# Patient Record
Sex: FEMALE | Race: WHITE | NOT HISPANIC OR LATINO | Employment: PART TIME | URBAN - METROPOLITAN AREA
[De-identification: names, ages, dates, MRNs, and addresses within clinical notes are randomized per-mention and may not be internally consistent; named-entity substitution may affect disease eponyms.]

---

## 2019-06-25 ENCOUNTER — CONSULT (OUTPATIENT)
Dept: CARDIOLOGY | Facility: CLINIC | Age: 64
End: 2019-06-25

## 2019-06-25 VITALS
BODY MASS INDEX: 24.07 KG/M2 | HEART RATE: 56 BPM | DIASTOLIC BLOOD PRESSURE: 82 MMHG | SYSTOLIC BLOOD PRESSURE: 130 MMHG | HEIGHT: 64 IN | WEIGHT: 141 LBS

## 2019-06-25 DIAGNOSIS — I10 ESSENTIAL HYPERTENSION: ICD-10-CM

## 2019-06-25 DIAGNOSIS — E78.2 MIXED HYPERLIPIDEMIA: Primary | ICD-10-CM

## 2019-06-25 PROCEDURE — 99204 OFFICE O/P NEW MOD 45 MIN: CPT | Performed by: INTERNAL MEDICINE

## 2019-06-25 RX ORDER — VALSARTAN 40 MG/1
40 TABLET ORAL DAILY
COMMUNITY
End: 2019-07-08 | Stop reason: SDUPTHER

## 2019-06-25 RX ORDER — NITROGLYCERIN 0.4 MG/1
0.4 TABLET SUBLINGUAL
COMMUNITY
End: 2020-09-22 | Stop reason: SDUPTHER

## 2019-06-25 RX ORDER — ASPIRIN 81 MG/1
81 TABLET ORAL DAILY
COMMUNITY

## 2019-06-25 RX ORDER — CARVEDILOL 12.5 MG/1
12.5 TABLET ORAL 2 TIMES DAILY WITH MEALS
COMMUNITY
End: 2019-07-08 | Stop reason: SDUPTHER

## 2019-06-25 RX ORDER — ATORVASTATIN CALCIUM 80 MG/1
80 TABLET, FILM COATED ORAL DAILY
COMMUNITY
End: 2019-07-08 | Stop reason: SDUPTHER

## 2019-06-25 NOTE — PROGRESS NOTES
[facility] at [facility]  Consultation H&P  Taya Russ  1955    There is no work phone number on file..    VISIT DATE:  06/25/2019    PCP: Provider, No Known  [facility] [id number]    CC:  Chief Complaint   Patient presents with   • Chest Pain     Previous cardiac studies and procedures:  June 11, 2019 cardiac catheterization: LAD proximal 30 to 40% stenosis, left circumflex luminal irregularities, 20 to 30% RCA, EF 60%, LVEDP 10, localized area of hypokinesis and dyskinesis in the inferior wall.    ASSESSMENT:   Diagnosis Plan   1. Mixed hyperlipidemia  Lipid Panel    Comprehensive Metabolic Panel   2. Essential hypertension     Unclear whether her event was triggered by transient intracoronary thrombosis versus coronary vasospasm versus hypertensive urgency.    PLAN:  Continue dual antiplatelet therapy for 90 days, discontinue Brilinta at that time and continue low-dose aspirin.  Continue carvedilol 12.5 mg p.o. twice daily with meals  Moving valsartan to bedtime dosing to limit side effects  Continue atorvastatin 80 mg p.o. daily, goal LDL less than 100, ideally less than 70.  Repeat lipid panel in 3 months.  Nitroglycerin as needed  Echocardiographic assessment of LVEF and regional wall motion abnormalities in 3 months.    History of Present Illness   64-year-old previously healthy female who developed moderate severe headaches with associated precordial chest discomfort on June 11.  Evaluation by EMS revealed severe hypertension with blood pressures over 200/100 mmHg, and dynamic repolarization abnormalities concerning for an acute inferior ST elevation myocardial infarction.  She underwent an urgent cardiac catheterization at [facility] which only revealed mild nonobstructive coronary atherosclerosis.  Angiogram did reveal normal EF with inferior hypokinesis.  Initial EKGs from [facility] on the day of her heart catheterization were unremarkable.  Follow-up EKG on  "June 12 revealed developing inferior T wave inversions with very subtle inferior ST elevation and no significant Q waves.  EKG today reveals persistent inferior T wave inversions which have now extended to V5 and V6 with the loss of R wave in lead III.  Troponin I of 11.1 on June 12.  CTA chest during hospitalization was unremarkable.  Echo not performed during this hospitalization.  She was treated with dual antiplatelet therapy, high intensity statin therapy and a combination of carvedilol and valsartan for suspected acute coronary syndrome.  Possible intracoronary spasm versus thrombosis which had resolved prior to angiogram or possible hypertensive urgency with associated regional wall motion abnormalities and EKG changes.  She has been stable on her current medical therapy with only mild fatigue.  Blood pressures are now consistently running less than 135/85 mmHg.  No further episodes of chest discomfort.  She has intermittent muscular skeletal tension headaches which are relieved and prevented with monthly deep tissue massage.    Her  passed away from complications from a ruptured gastric ulcer, she is concerned about the long-term risk factors of aspirin therapy.    PHYSICAL EXAMINATION:  Vitals:    06/25/19 1135   BP: 130/82   BP Location: Left arm   Patient Position: Sitting   Pulse: 56   Weight: 64 kg (141 lb)   Height: 162.6 cm (64\")     General Appearance:    Alert, cooperative, no distress, appears stated age   Head:    Normocephalic, without obvious abnormality, atraumatic   Eyes:    conjunctiva/corneas clear, EOM's intact, fundi     benign, both eyes   Ears:    Normal TM's and external ear canals, both ears   Nose:   Nares normal, septum midline, mucosa normal, no drainage    or sinus tenderness   Throat:   Lips, mucosa, and tongue normal; teeth and gums normal   Neck:   Supple, symmetrical, trachea midline, no adenopathy;     thyroid:  no enlargement/tenderness/nodules; no carotid    bruit or " JVD   Back:     Symmetric, no curvature, ROM normal, no CVA tenderness   Lungs:     Clear to auscultation bilaterally, respirations unlabored   Chest Wall:    No tenderness or deformity    Heart:    Regular rate and rhythm, S1 and S2 normal, no murmur, rub   or gallop, normal carotid impulse bilaterally without bruit.   Abdomen:     Soft, non-tender, bowel sounds active all four quadrants,     no masses, no organomegaly   Extremities:   Extremities normal, atraumatic, no cyanosis or edema   Pulses:   2+ and symmetric all extremities   Skin:   Skin color, texture, turgor normal, no rashes or lesions   Lymph nodes:   Cervical, supraclavicular, and axillary nodes normal   Neurologic:   normal strength, sensation intact     throughout       Diagnostic Data:  Procedures  No results found for: CHLPL, TRIG, HDL, LDLDIRECT  No results found for: GLUCOSE, BUN, CREATININE, NA, K, CL, CO2, CREATININE, BUN  No results found for: HGBA1C  No results found for: WBC, HGB, HCT, PLT    PROBLEM LIST:  Patient Active Problem List   Diagnosis   • Essential hypertension       PAST MEDICAL HX  History reviewed. No pertinent past medical history.    Allergies  No Known Allergies    Current Medications    Current Outpatient Medications:   •  aspirin 81 MG EC tablet, Take 81 mg by mouth Daily., Disp: , Rfl:   •  atorvastatin (LIPITOR) 80 MG tablet, Take 80 mg by mouth Daily., Disp: , Rfl:   •  carvedilol (COREG) 12.5 MG tablet, Take 12.5 mg by mouth 2 (Two) Times a Day With Meals., Disp: , Rfl:   •  nitroglycerin (NITROSTAT) 0.4 MG SL tablet, Place 0.4 mg under the tongue Every 5 (Five) Minutes As Needed for Chest Pain. Take no more than 3 doses in 15 minutes., Disp: , Rfl:   •  ticagrelor (BRILINTA) 90 MG tablet tablet, Take 90 mg by mouth 2 (Two) Times a Day., Disp: , Rfl:   •  valsartan (DIOVAN) 40 MG tablet, Take 40 mg by mouth Daily., Disp: , Rfl:          ROS  Review of Systems   Constitution: Positive for malaise/fatigue.   HENT:  Positive for tinnitus.    Musculoskeletal: Positive for myalgias.       All other body systems reviewed and are negative    SOCIAL HX  Social History     Socioeconomic History   • Marital status:      Spouse name: Not on file   • Number of children: Not on file   • Years of education: Not on file   • Highest education level: Not on file   Tobacco Use   • Smoking status: Former Smoker     Last attempt to quit:      Years since quittin.5   • Smokeless tobacco: Never Used   Substance and Sexual Activity   • Alcohol use: Yes     Alcohol/week: 0.6 - 1.2 oz     Types: 1 - 2 Standard drinks or equivalent per week     Frequency: Monthly or less     Drinks per session: 1 or 2     Binge frequency: Never   • Drug use: No   • Sexual activity: Defer       FAMILY HX  Family History   Problem Relation Age of Onset   • No Known Problems Mother    • No Known Problems Father    • No Known Problems Sister    • No Known Problems Sister    • No Known Problems Sister              Aleks Richard III, MD, FACC

## 2019-07-08 RX ORDER — ATORVASTATIN CALCIUM 80 MG/1
80 TABLET, FILM COATED ORAL DAILY
Qty: 90 TABLET | Refills: 1 | Status: SHIPPED | OUTPATIENT
Start: 2019-07-08 | End: 2019-10-23 | Stop reason: DRUGHIGH

## 2019-07-08 RX ORDER — CARVEDILOL 12.5 MG/1
12.5 TABLET ORAL 2 TIMES DAILY WITH MEALS
Qty: 180 TABLET | Refills: 1 | Status: SHIPPED | OUTPATIENT
Start: 2019-07-08 | End: 2019-08-12 | Stop reason: DRUGHIGH

## 2019-07-08 RX ORDER — VALSARTAN 40 MG/1
40 TABLET ORAL DAILY
Qty: 90 TABLET | Refills: 1 | Status: SHIPPED | OUTPATIENT
Start: 2019-07-08 | End: 2020-01-23

## 2019-07-11 ENCOUNTER — TELEPHONE (OUTPATIENT)
Dept: CARDIOLOGY | Facility: CLINIC | Age: 64
End: 2019-07-11

## 2019-07-11 NOTE — TELEPHONE ENCOUNTER
Had low b/p reading yesterday 90/50 Did not feel well. Tired. Feeling better today. Gave B/P readings. No HR'S reported.    7/5/19  119/85  7/6/19  117/80  7/7/19  112/78  7/8/19  136/92  7/9/19  114/71  7/10/19  117/76  7/11/19  95/64    She wants to know if you want to make any medication adjustments. Please advise.

## 2019-08-12 RX ORDER — CARVEDILOL 6.25 MG/1
TABLET ORAL
Qty: 90 TABLET | Refills: 1 | Status: SHIPPED | OUTPATIENT
Start: 2019-08-12 | End: 2019-10-22 | Stop reason: SDUPTHER

## 2019-09-11 RX ORDER — TICAGRELOR 90 MG/1
TABLET ORAL
Qty: 60 TABLET | Refills: 1 | Status: SHIPPED | OUTPATIENT
Start: 2019-09-11 | End: 2019-10-22

## 2019-10-14 ENCOUNTER — HOSPITAL ENCOUNTER (OUTPATIENT)
Dept: CARDIOLOGY | Facility: HOSPITAL | Age: 64
Discharge: HOME | End: 2019-10-14

## 2019-10-14 DIAGNOSIS — Z00.6 ENCOUNTER FOR EXAMINATION FOR NORMAL COMPARISON OR CONTROL IN CLINICAL RESEARCH PROGRAM: ICD-10-CM

## 2019-10-22 ENCOUNTER — OFFICE VISIT (OUTPATIENT)
Dept: CARDIOLOGY | Facility: CLINIC | Age: 64
End: 2019-10-22

## 2019-10-22 VITALS
HEIGHT: 64 IN | WEIGHT: 139 LBS | HEART RATE: 64 BPM | DIASTOLIC BLOOD PRESSURE: 84 MMHG | BODY MASS INDEX: 23.73 KG/M2 | SYSTOLIC BLOOD PRESSURE: 134 MMHG | OXYGEN SATURATION: 98 %

## 2019-10-22 DIAGNOSIS — I10 ESSENTIAL HYPERTENSION: Primary | ICD-10-CM

## 2019-10-22 DIAGNOSIS — I42.8 NON-ISCHEMIC CARDIOMYOPATHY (HCC): ICD-10-CM

## 2019-10-22 PROCEDURE — 99213 OFFICE O/P EST LOW 20 MIN: CPT | Performed by: INTERNAL MEDICINE

## 2019-10-22 NOTE — PROGRESS NOTES
[facility] at [facility]  Office visit  Taya Russ  1955    [phone] (work)    VISIT DATE:  10/22/2019    PCP: Provider, No Known  [place] [phone]    CC:  Chief Complaint   Patient presents with   • Essential hypertension   • Shortness of Breath       Previous cardiac studies and procedures:  June 11, 2019 cardiac catheterization: LAD proximal 30 to 40% stenosis, left circumflex luminal irregularities, 20 to 30% RCA, EF 60%, LVEDP 10, localized area of hypokinesis and dyskinesis in the inferior wall.  ASSESSMENT:   Diagnosis Plan   1. Essential hypertension         PLAN:  Hypertension: Goal less than 130/80 mmHg.  Currently well controlled.  Continue combination of carvedilol and valsartan.    Coronary vasospasm: Personally reviewed catheterization imaging.  She appeared to have mild diffuse coronary vasospasm involving the mid to distal segments of her epicardial vessels.  This was potentially triggered by her hypertensive urgency.  Discontinuing Brilinta, continue low-dose aspirin every other day.  Continue afterload control and as needed nitroglycerin.  Will obtain fasting lipid panel, goal LDL less than 100, will likely be able to wean back on her atorvastatin dose.    Cardiomyopathy: Nonischemic, suspect due to hypertension.  Recommending cardiac MRI for definitive assessment of regional wall motion abnormality and for evidence of myocardial infarction/fibrosis given focal inferior wall regional wall motion abnormality is noted on LV angiography.    Subjective  Interval assessment: Blood pressures now predominantly running less than 135/85 mmHg.  She denies chest pain and palpitations.  Has mild shortness of breath with such activities as walking up to 3 flights of stairs.  Intermittently feels like she cannot get a good deep breath.  Denies easy bruising or bleeding complications on combination of aspirin and Brilinta.  Denies myalgias on statin  therapy.    Initial presentation:64-year-old previously healthy female who developed moderate severe headaches with associated precordial chest discomfort on June 11.  Evaluation by EMS revealed severe hypertension with blood pressures over 200/100 mmHg, and dynamic repolarization abnormalities concerning for an acute inferior ST elevation myocardial infarction.  She underwent an urgent cardiac catheterization at [facility] which only revealed mild nonobstructive coronary atherosclerosis.  Angiogram did reveal normal EF with inferior hypokinesis.  Initial EKGs from [facility] on the day of her heart catheterization were unremarkable.  Follow-up EKG on June 12 revealed developing inferior T wave inversions with very subtle inferior ST elevation and no significant Q waves.  EKG today reveals persistent inferior T wave inversions which have now extended to V5 and V6 with the loss of R wave in lead III.  Troponin I of 11.1 on June 12.  CTA chest during hospitalization was unremarkable.  Echo not performed during this hospitalization.  She was treated with dual antiplatelet therapy, high intensity statin therapy and a combination of carvedilol and valsartan for suspected acute coronary syndrome.  Possible intracoronary spasm versus thrombosis which had resolved prior to angiogram or possible hypertensive urgency with associated regional wall motion abnormalities and EKG changes.  She has been stable on her current medical therapy with only mild fatigue.  Blood pressures are now consistently running less than 135/85 mmHg.  No further episodes of chest discomfort.  She has intermittent muscular skeletal tension headaches which are relieved and prevented with monthly deep tissue massage.     Her  passed away from complications from a ruptured gastric ulcer, she is concerned about the long-term risk factors of aspirin therapy.    PHYSICAL EXAMINATION:  Vitals:    10/22/19 1533   Weight: 63 kg (139 lb)   Height:  "162.6 cm (64\")     General Appearance:    Alert, cooperative, no distress, appears stated age   Head:    Normocephalic, without obvious abnormality, atraumatic   Eyes:    conjunctiva/corneas clear   Nose:   Nares normal, septum midline, mucosa normal, no drainage   Throat:   Lips, teeth and gums normal   Neck:   Supple, symmetrical, trachea midline, no carotid    bruit or JVD   Lungs:     Clear to auscultation bilaterally, respirations unlabored   Chest Wall:    No tenderness or deformity    Heart:    Regular rate and rhythm, S1 and S2 normal, no murmur, rub   or gallop, normal carotid impulse bilaterally without bruit.   Abdomen:     Soft, non-tender   Extremities:   Extremities normal, atraumatic, no cyanosis or edema   Pulses:   2+ and symmetric all extremities   Skin:   Skin color, texture, turgor normal, no rashes or lesions       Diagnostic Data:  Procedures  No results found for: CHLPL, TRIG, HDL, LDLDIRECT  No results found for: GLUCOSE, BUN, CREATININE, NA, K, CL, CO2, CREATININE, BUN  No results found for: HGBA1C  No results found for: WBC, HGB, HCT, PLT    Allergies  No Known Allergies    Current Medications    Current Outpatient Medications:   •  aspirin 81 MG EC tablet, Take 81 mg by mouth Daily., Disp: , Rfl:   •  atorvastatin (LIPITOR) 80 MG tablet, Take 1 tablet by mouth Daily., Disp: 90 tablet, Rfl: 1  •  BRILINTA 90 MG tablet tablet, TAKE ONE TABLET BY MOUTH TWICE A DAY, Disp: 60 tablet, Rfl: 1  •  Calcium Citrate (CITRACAL PO), Take 2 tablets by mouth 2 (Two) Times a Day., Disp: , Rfl:   •  carvedilol (COREG) 6.25 MG tablet, Take one tablet by mouth in the morning with a meal and two tablets by mouth in the evening with meals., Disp: 90 tablet, Rfl: 1  •  nitroglycerin (NITROSTAT) 0.4 MG SL tablet, Place 0.4 mg under the tongue Every 5 (Five) Minutes As Needed for Chest Pain. Take no more than 3 doses in 15 minutes., Disp: , Rfl:   •  valsartan (DIOVAN) 40 MG tablet, Take 1 tablet by mouth Daily., " Disp: 90 tablet, Rfl: 1          ROS  Review of Systems   Cardiovascular: Positive for dyspnea on exertion.   Respiratory: Positive for shortness of breath.          SOCIAL HX  Social History     Socioeconomic History   • Marital status:      Spouse name: Not on file   • Number of children: Not on file   • Years of education: Not on file   • Highest education level: Not on file   Tobacco Use   • Smoking status: Former Smoker     Last attempt to quit:      Years since quittin.8   • Smokeless tobacco: Never Used   Substance and Sexual Activity   • Alcohol use: Yes     Alcohol/week: 0.6 - 1.2 oz     Types: 1 - 2 Standard drinks or equivalent per week     Frequency: Monthly or less     Drinks per session: 1 or 2     Binge frequency: Never   • Drug use: No   • Sexual activity: Defer       FAMILY HX  Family History   Problem Relation Age of Onset   • No Known Problems Mother    • No Known Problems Father    • No Known Problems Sister    • No Known Problems Sister    • No Known Problems Sister              Aleks Richard III, MD, FACC

## 2019-10-23 ENCOUNTER — TELEPHONE (OUTPATIENT)
Dept: CARDIOLOGY | Facility: CLINIC | Age: 64
End: 2019-10-23

## 2019-10-23 DIAGNOSIS — I10 ESSENTIAL HYPERTENSION: Primary | ICD-10-CM

## 2019-10-23 RX ORDER — CARVEDILOL 6.25 MG/1
TABLET ORAL
Qty: 90 TABLET | Refills: 3 | Status: SHIPPED | OUTPATIENT
Start: 2019-10-23 | End: 2020-03-23

## 2019-10-23 RX ORDER — ATORVASTATIN CALCIUM 40 MG/1
40 TABLET, FILM COATED ORAL DAILY
Qty: 30 TABLET | Refills: 5 | Status: SHIPPED | OUTPATIENT
Start: 2019-10-23 | End: 2020-03-11 | Stop reason: DRUGHIGH

## 2019-10-23 NOTE — TELEPHONE ENCOUNTER
Dr. Richard reviewed patients labs from PCP. Per his orders. Called patient to tell her to decrease her Atorvastatin to 40 mg by mouth daily and needs a repeat BMP this week. Left voice message for her to call our office. Orders placed in [email or web address].

## 2019-10-24 ENCOUNTER — LAB (OUTPATIENT)
Dept: LAB | Facility: HOSPITAL | Age: 64
End: 2019-10-24

## 2019-10-24 DIAGNOSIS — I10 ESSENTIAL HYPERTENSION: ICD-10-CM

## 2019-10-24 PROCEDURE — 80048 BASIC METABOLIC PNL TOTAL CA: CPT

## 2019-10-25 ENCOUNTER — TELEPHONE (OUTPATIENT)
Dept: CARDIOLOGY | Facility: CLINIC | Age: 64
End: 2019-10-25

## 2019-10-25 LAB
ANION GAP SERPL CALCULATED.3IONS-SCNC: 10.9 MMOL/L (ref 5–15)
BUN BLD-MCNC: 19 MG/DL (ref 8–23)
BUN/CREAT SERPL: 28.8 (ref 7–25)
CALCIUM SPEC-SCNC: 9.3 MG/DL (ref 8.6–10.5)
CHLORIDE SERPL-SCNC: 104 MMOL/L (ref 98–107)
CO2 SERPL-SCNC: 26.1 MMOL/L (ref 22–29)
CREAT BLD-MCNC: 0.66 MG/DL (ref 0.57–1)
GFR SERPL CREATININE-BSD FRML MDRD: 90 ML/MIN/1.73
GLUCOSE BLD-MCNC: 91 MG/DL (ref 65–99)
POTASSIUM BLD-SCNC: 4.6 MMOL/L (ref 3.5–5.2)
SODIUM BLD-SCNC: 141 MMOL/L (ref 136–145)

## 2019-10-25 NOTE — TELEPHONE ENCOUNTER
----- Message from Aleks Richard III, MD sent at 10/25/2019 11:54 AM EDT -----  Potassium levels have normalized

## 2019-11-04 ENCOUNTER — HOSPITAL ENCOUNTER (OUTPATIENT)
Dept: MRI IMAGING | Facility: HOSPITAL | Age: 64
Discharge: HOME | End: 2019-11-04
Admitting: INTERNAL MEDICINE

## 2019-11-04 PROCEDURE — 75561 CARDIAC MRI FOR MORPH W/DYE: CPT

## 2019-11-04 PROCEDURE — A9577 INJ MULTIHANCE: HCPCS | Performed by: INTERNAL MEDICINE

## 2019-11-04 PROCEDURE — 0 GADOBENATE DIMEGLUMINE 529 MG/ML SOLUTION: Performed by: INTERNAL MEDICINE

## 2019-11-04 RX ADMIN — GADOBENATE DIMEGLUMINE 20 ML: 529 INJECTION, SOLUTION INTRAVENOUS at 11:09

## 2020-01-23 RX ORDER — VALSARTAN 40 MG/1
TABLET ORAL
Qty: 30 TABLET | Refills: 0 | Status: SHIPPED | OUTPATIENT
Start: 2020-01-23 | End: 2020-02-26

## 2020-02-26 RX ORDER — VALSARTAN 40 MG/1
TABLET ORAL
Qty: 30 TABLET | Refills: 1 | Status: SHIPPED | OUTPATIENT
Start: 2020-02-26 | End: 2020-03-10 | Stop reason: SDUPTHER

## 2020-03-10 ENCOUNTER — APPOINTMENT (OUTPATIENT)
Dept: LAB | Facility: HOSPITAL | Age: 65
End: 2020-03-10

## 2020-03-10 ENCOUNTER — OFFICE VISIT (OUTPATIENT)
Dept: CARDIOLOGY | Facility: CLINIC | Age: 65
End: 2020-03-10

## 2020-03-10 VITALS
HEIGHT: 64 IN | BODY MASS INDEX: 25.23 KG/M2 | OXYGEN SATURATION: 98 % | HEART RATE: 62 BPM | DIASTOLIC BLOOD PRESSURE: 86 MMHG | WEIGHT: 147.8 LBS | SYSTOLIC BLOOD PRESSURE: 138 MMHG

## 2020-03-10 DIAGNOSIS — E78.2 MIXED HYPERLIPIDEMIA: Primary | ICD-10-CM

## 2020-03-10 DIAGNOSIS — I10 ESSENTIAL HYPERTENSION: ICD-10-CM

## 2020-03-10 DIAGNOSIS — I25.10 CORONARY ARTERY DISEASE INVOLVING NATIVE CORONARY ARTERY OF NATIVE HEART WITHOUT ANGINA PECTORIS: ICD-10-CM

## 2020-03-10 LAB
ALBUMIN SERPL-MCNC: 4.4 G/DL (ref 3.5–5.2)
ALBUMIN/GLOB SERPL: 2 G/DL
ALP SERPL-CCNC: 48 U/L (ref 39–117)
ALT SERPL W P-5'-P-CCNC: 15 U/L (ref 1–33)
ANION GAP SERPL CALCULATED.3IONS-SCNC: 14.1 MMOL/L (ref 5–15)
AST SERPL-CCNC: 16 U/L (ref 1–32)
BILIRUB SERPL-MCNC: 0.4 MG/DL (ref 0.2–1.2)
BUN BLD-MCNC: 11 MG/DL (ref 8–23)
BUN/CREAT SERPL: 18 (ref 7–25)
CALCIUM SPEC-SCNC: 9.7 MG/DL (ref 8.6–10.5)
CHLORIDE SERPL-SCNC: 103 MMOL/L (ref 98–107)
CHOLEST SERPL-MCNC: 116 MG/DL (ref 0–200)
CO2 SERPL-SCNC: 23.9 MMOL/L (ref 22–29)
CREAT BLD-MCNC: 0.61 MG/DL (ref 0.57–1)
GFR SERPL CREATININE-BSD FRML MDRD: 99 ML/MIN/1.73
GLOBULIN UR ELPH-MCNC: 2.2 GM/DL
GLUCOSE BLD-MCNC: 99 MG/DL (ref 65–99)
HDLC SERPL-MCNC: 56 MG/DL (ref 40–60)
LDLC SERPL CALC-MCNC: 48 MG/DL (ref 0–100)
LDLC/HDLC SERPL: 0.86 {RATIO}
POTASSIUM BLD-SCNC: 4.8 MMOL/L (ref 3.5–5.2)
PROT SERPL-MCNC: 6.6 G/DL (ref 6–8.5)
SODIUM BLD-SCNC: 141 MMOL/L (ref 136–145)
TRIGL SERPL-MCNC: 58 MG/DL (ref 0–150)
VLDLC SERPL-MCNC: 11.6 MG/DL (ref 5–40)

## 2020-03-10 PROCEDURE — 80061 LIPID PANEL: CPT | Performed by: INTERNAL MEDICINE

## 2020-03-10 PROCEDURE — 80053 COMPREHEN METABOLIC PANEL: CPT | Performed by: INTERNAL MEDICINE

## 2020-03-10 PROCEDURE — 36415 COLL VENOUS BLD VENIPUNCTURE: CPT | Performed by: INTERNAL MEDICINE

## 2020-03-10 PROCEDURE — 99213 OFFICE O/P EST LOW 20 MIN: CPT | Performed by: INTERNAL MEDICINE

## 2020-03-10 RX ORDER — VALSARTAN 40 MG/1
40 TABLET ORAL DAILY
Qty: 90 TABLET | Refills: 3 | Status: SHIPPED | OUTPATIENT
Start: 2020-03-10 | End: 2020-09-22 | Stop reason: SDUPTHER

## 2020-03-10 NOTE — PROGRESS NOTES
[facility] at [facility]  Office visit  Taya Russ  1955    [phone] (work)    VISIT DATE:  3/10/2020    PCP: Provider, Thelma Known  [place] [phone]    CC:  Chief Complaint   Patient presents with   • Hypertension       Previous cardiac studies and procedures:  June 11, 2019 cardiac catheterization: LAD proximal 30 to 40% stenosis, left circumflex luminal irregularities, 20 to 30% RCA, EF 60%, LVEDP 10, localized area of hypokinesis and dyskinesis in the inferior wall.    November 2019 cardiac MRI: Small mid inferior wall transmural infarct, EF 60%    ASSESSMENT:   Diagnosis Plan   1. Mixed hyperlipidemia  Lipid Panel    Comprehensive Metabolic Panel   2. Coronary artery disease involving native coronary artery of native heart without angina pectoris     3. Essential hypertension         PLAN:  Hypertension: Goal less than 130/80 mmHg.  Currently well controlled.  Continue combination of carvedilol and valsartan.    Coronary disease: Unclear etiology for inferior myocardial infarction, potential etiologies include prolonged vasospasm, embolic event, scad.  Angiography did not reveal significant atherosclerotic disease.  Currently asymptomatic.  Continue aspirin, statin and afterload reduction.    Hyperlipidemia: Goal LDL less than 70.  Repeat fasting lipid panel pending.  Currently on atorvastatin 40 mg p.o. Daily.    Subjective  Interval assessment: Blood pressures running less than 130/80 mmHg.  She is compliant with medical therapy.  Maintaining an active lifestyle without limitation.  Denies easy bruising or bleeding complications on aspirin.  Denies myalgias on statin therapy.    Initial presentation:64-year-old previously healthy female who developed moderate severe headaches with associated precordial chest discomfort on June 11.  Evaluation by EMS revealed severe hypertension with blood pressures over 200/100 mmHg, and dynamic repolarization abnormalities  "concerning for an acute inferior ST elevation myocardial infarction.  She underwent an urgent cardiac catheterization at [facility] which only revealed mild nonobstructive coronary atherosclerosis.  Angiogram did reveal normal EF with inferior hypokinesis.  Initial EKGs from [facility] on the day of her heart catheterization were unremarkable.  Follow-up EKG on June 12 revealed developing inferior T wave inversions with very subtle inferior ST elevation and no significant Q waves.  EKG today reveals persistent inferior T wave inversions which have now extended to V5 and V6 with the loss of R wave in lead III.  Troponin I of 11.1 on June 12.  CTA chest during hospitalization was unremarkable.  Echo not performed during this hospitalization.  She was treated with dual antiplatelet therapy, high intensity statin therapy and a combination of carvedilol and valsartan for suspected acute coronary syndrome.  Possible intracoronary spasm versus thrombosis which had resolved prior to angiogram or possible hypertensive urgency with associated regional wall motion abnormalities and EKG changes.  She has been stable on her current medical therapy with only mild fatigue.  Blood pressures are now consistently running less than 135/85 mmHg.  No further episodes of chest discomfort.  She has intermittent muscular skeletal tension headaches which are relieved and prevented with monthly deep tissue massage.     Her  passed away from complications from a ruptured gastric ulcer, she is concerned about the long-term risk factors of aspirin therapy.    PHYSICAL EXAMINATION:  Vitals:    03/10/20 0935   BP: 138/86   BP Location: Left arm   Patient Position: Sitting   Pulse: 62   SpO2: 98%   Weight: 67 kg (147 lb 12.8 oz)   Height: 162.6 cm (64\")     General Appearance:    Alert, cooperative, no distress, appears stated age   Head:    Normocephalic, without obvious abnormality, atraumatic   Eyes:    conjunctiva/corneas clear "   Nose:   Nares normal, septum midline, mucosa normal, no drainage   Throat:   Lips, teeth and gums normal   Neck:   Supple, symmetrical, trachea midline, no carotid    bruit or JVD   Lungs:     Clear to auscultation bilaterally, respirations unlabored   Chest Wall:    No tenderness or deformity    Heart:    Regular rate and rhythm, S1 and S2 normal, no murmur, rub   or gallop, normal carotid impulse bilaterally without bruit.   Abdomen:     Soft, non-tender   Extremities:   Extremities normal, atraumatic, no cyanosis or edema   Pulses:   2+ and symmetric all extremities   Skin:   Skin color, texture, turgor normal, no rashes or lesions       Diagnostic Data:  Procedures  No results found for: CHLPL, TRIG, HDL, LDLDIRECT  Lab Results   Component Value Date    GLUCOSE 91 10/24/2019    BUN 19 10/24/2019    CREATININE 0.66 10/24/2019     10/24/2019    K 4.6 10/24/2019     10/24/2019    CO2 26.1 10/24/2019     No results found for: HGBA1C  No results found for: WBC, HGB, HCT, PLT    Allergies  No Known Allergies    Current Medications    Current Outpatient Medications:   •  aspirin 81 MG EC tablet, Take 81 mg by mouth Daily., Disp: , Rfl:   •  atorvastatin (LIPITOR) 40 MG tablet, Take 1 tablet by mouth Daily., Disp: 30 tablet, Rfl: 5  •  Calcium Citrate (CITRACAL PO), Take 2 tablets by mouth 2 (Two) Times a Day., Disp: , Rfl:   •  carvedilol (COREG) 6.25 MG tablet, TAKE ONE TABLET BY MOUTH EVERY MORNING WITH A MEAL AND TAKE TWO TABLETS BY MOUTH EVERY EVENING WITH MEALS, Disp: 90 tablet, Rfl: 3  •  nitroglycerin (NITROSTAT) 0.4 MG SL tablet, Place 0.4 mg under the tongue Every 5 (Five) Minutes As Needed for Chest Pain. Take no more than 3 doses in 15 minutes., Disp: , Rfl:   •  valsartan (DIOVAN) 40 MG tablet, Take 1 tablet by mouth Daily., Disp: 90 tablet, Rfl: 3          ROS  Review of Systems   Cardiovascular: Positive for dyspnea on exertion.   Respiratory: Positive for shortness of breath.           SOCIAL HX  Social History     Socioeconomic History   • Marital status:      Spouse name: Not on file   • Number of children: Not on file   • Years of education: Not on file   • Highest education level: Not on file   Tobacco Use   • Smoking status: Former Smoker     Packs/day: 1.00     Types: Cigarettes     Last attempt to quit:      Years since quittin.2   • Smokeless tobacco: Never Used   Substance and Sexual Activity   • Alcohol use: Yes     Alcohol/week: 1.0 - 2.0 standard drinks     Types: 1 - 2 Standard drinks or equivalent per week     Frequency: Monthly or less     Drinks per session: 1 or 2     Binge frequency: Never     Comment: occas   • Drug use: No   • Sexual activity: Defer       FAMILY HX  Family History   Problem Relation Age of Onset   • Hypertension Mother    • No Known Problems Father    • No Known Problems Sister    • No Known Problems Sister    • No Known Problems Sister              Aleks Richard III, MD, FACC

## 2020-03-11 ENCOUNTER — TELEPHONE (OUTPATIENT)
Dept: CARDIOLOGY | Facility: CLINIC | Age: 65
End: 2020-03-11

## 2020-03-11 RX ORDER — ATORVASTATIN CALCIUM 20 MG/1
20 TABLET, FILM COATED ORAL DAILY
Qty: 30 TABLET | Refills: 5 | Status: SHIPPED | OUTPATIENT
Start: 2020-03-11 | End: 2020-09-22 | Stop reason: SDUPTHER

## 2020-03-11 NOTE — TELEPHONE ENCOUNTER
----- Message from Aleks Richard III, MD sent at 3/11/2020  8:30 AM EDT -----  Cholesterol numbers look great.  We can decrease the atorvastatin down to 20 mg p.o. daily.

## 2020-03-23 RX ORDER — CARVEDILOL 6.25 MG/1
TABLET ORAL
Qty: 90 TABLET | Refills: 3 | Status: SHIPPED | OUTPATIENT
Start: 2020-03-23 | End: 2020-06-26

## 2020-05-07 RX ORDER — VALSARTAN 40 MG/1
TABLET ORAL
Qty: 30 TABLET | Refills: 0 | OUTPATIENT
Start: 2020-05-07

## 2020-06-26 RX ORDER — CARVEDILOL 6.25 MG/1
TABLET ORAL
Qty: 90 TABLET | Refills: 2 | Status: SHIPPED | OUTPATIENT
Start: 2020-06-26 | End: 2020-09-22 | Stop reason: SDUPTHER

## 2020-06-29 RX ORDER — CARVEDILOL 6.25 MG/1
TABLET ORAL
Qty: 90 TABLET | Refills: 2 | OUTPATIENT
Start: 2020-06-29

## 2020-09-22 ENCOUNTER — OFFICE VISIT (OUTPATIENT)
Dept: CARDIOLOGY | Facility: CLINIC | Age: 65
End: 2020-09-22

## 2020-09-22 VITALS
DIASTOLIC BLOOD PRESSURE: 80 MMHG | SYSTOLIC BLOOD PRESSURE: 136 MMHG | HEART RATE: 62 BPM | WEIGHT: 138.8 LBS | BODY MASS INDEX: 23.7 KG/M2 | OXYGEN SATURATION: 98 % | HEIGHT: 64 IN

## 2020-09-22 DIAGNOSIS — E78.2 MIXED HYPERLIPIDEMIA: ICD-10-CM

## 2020-09-22 DIAGNOSIS — I10 ESSENTIAL HYPERTENSION: Primary | ICD-10-CM

## 2020-09-22 DIAGNOSIS — I25.10 CORONARY ARTERY DISEASE INVOLVING NATIVE CORONARY ARTERY OF NATIVE HEART WITHOUT ANGINA PECTORIS: ICD-10-CM

## 2020-09-22 PROCEDURE — 99214 OFFICE O/P EST MOD 30 MIN: CPT | Performed by: INTERNAL MEDICINE

## 2020-09-22 RX ORDER — VALSARTAN 40 MG/1
40 TABLET ORAL DAILY
Qty: 90 TABLET | Refills: 3 | Status: SHIPPED | OUTPATIENT
Start: 2020-09-22 | End: 2021-09-28 | Stop reason: SDUPTHER

## 2020-09-22 RX ORDER — ATORVASTATIN CALCIUM 20 MG/1
20 TABLET, FILM COATED ORAL DAILY
Qty: 90 TABLET | Refills: 3 | Status: SHIPPED | OUTPATIENT
Start: 2020-09-22 | End: 2021-09-24

## 2020-09-22 RX ORDER — NITROGLYCERIN 0.4 MG/1
0.4 TABLET SUBLINGUAL
Qty: 25 TABLET | Refills: 3 | Status: SHIPPED | OUTPATIENT
Start: 2020-09-22

## 2020-09-22 RX ORDER — CARVEDILOL 6.25 MG/1
TABLET ORAL
Qty: 90 TABLET | Refills: 3 | Status: SHIPPED | OUTPATIENT
Start: 2020-09-22 | End: 2021-02-17

## 2020-09-22 RX ORDER — COLLAGEN, HYDROLYSATE (BOVINE) 100 %
POWDER (GRAM) MISCELLANEOUS
COMMUNITY

## 2020-09-22 NOTE — PROGRESS NOTES
[facility] at [facility]  Office visit  Taya Russ  1955    There is no work phone number on file.    VISIT DATE:  9/22/2020    PCP: Provider, Thelma Known  [facility] [id number]    CC:  Chief Complaint   Patient presents with   • Mixed hyperlipidemia       Previous cardiac studies and procedures:  June 11, 2019 cardiac catheterization: LAD proximal 30 to 40% stenosis, left circumflex luminal irregularities, 20 to 30% RCA, EF 60%, LVEDP 10, localized area of hypokinesis and dyskinesis in the inferior wall.    November 2019 cardiac MRI: Small mid inferior wall transmural infarct, EF 60%    ASSESSMENT:   Diagnosis Plan   1. Essential hypertension     2. Coronary artery disease involving native coronary artery of native heart without angina pectoris     3. Mixed hyperlipidemia  Lipid Panel    Comprehensive Metabolic Panel       PLAN:  Hypertension: Goal less than 130/80 mmHg.  Currently well controlled.  Continue combination of carvedilol and valsartan.    Coronary disease: Unclear etiology for inferior myocardial infarction, potential etiologies include prolonged vasospasm, embolic event, scad.  Angiography did not reveal significant atherosclerotic disease.  Currently asymptomatic.  Continue aspirin, statin and afterload reduction.    Hyperlipidemia: Goal LDL less than 70.  Previously well controlled.  Repeat fasting lipid panel pending.  Currently on atorvastatin 20 mg p.o. Daily.    Subjective  Interval assessment: Blood pressures running less than 130/80 mmHg.  She is compliant with medical therapy.  Maintaining an active lifestyle without limitation, some mild fatigue but otherwise no limitations..  Denies easy bruising or bleeding complications on aspirin.  Denies myalgias on statin therapy.    Initial presentation:64-year-old previously healthy female who developed moderate severe headaches with associated precordial chest discomfort on June 11.  Evaluation by EMS revealed  "severe hypertension with blood pressures over 200/100 mmHg, and dynamic repolarization abnormalities concerning for an acute inferior ST elevation myocardial infarction.  She underwent an urgent cardiac catheterization at [facility] which only revealed mild nonobstructive coronary atherosclerosis.  Angiogram did reveal normal EF with inferior hypokinesis.  Initial EKGs from [facility] on the day of her heart catheterization were unremarkable.  Follow-up EKG on June 12 revealed developing inferior T wave inversions with very subtle inferior ST elevation and no significant Q waves.  EKG today reveals persistent inferior T wave inversions which have now extended to V5 and V6 with the loss of R wave in lead III.  Troponin I of 11.1 on June 12.  CTA chest during hospitalization was unremarkable.  Echo not performed during this hospitalization.  She was treated with dual antiplatelet therapy, high intensity statin therapy and a combination of carvedilol and valsartan for suspected acute coronary syndrome.  Possible intracoronary spasm versus thrombosis which had resolved prior to angiogram or possible hypertensive urgency with associated regional wall motion abnormalities and EKG changes.  She has been stable on her current medical therapy with only mild fatigue.  Blood pressures are now consistently running less than 135/85 mmHg.  No further episodes of chest discomfort.  She has intermittent muscular skeletal tension headaches which are relieved and prevented with monthly deep tissue massage.     Her  passed away from complications from a ruptured gastric ulcer, she is concerned about the long-term risk factors of aspirin therapy.    PHYSICAL EXAMINATION:  Vitals:    09/22/20 0932   BP: 136/80   BP Location: Right arm   Patient Position: Sitting   Pulse: 62   SpO2: 98%   Weight: 63 kg (138 lb 12.8 oz)   Height: 162.6 cm (64\")     General Appearance:    Alert, cooperative, no distress, appears stated age "   Head:    Normocephalic, without obvious abnormality, atraumatic   Eyes:    conjunctiva/corneas clear   Nose:   Nares normal, septum midline, mucosa normal, no drainage   Throat:   Lips, teeth and gums normal   Neck:   Supple, symmetrical, trachea midline, no carotid    bruit or JVD   Lungs:     Clear to auscultation bilaterally, respirations unlabored   Chest Wall:    No tenderness or deformity    Heart:    Regular rate and rhythm, S1 and S2 normal, no murmur, rub   or gallop, normal carotid impulse bilaterally without bruit.   Abdomen:     Soft, non-tender   Extremities:   Extremities normal, atraumatic, no cyanosis or edema   Pulses:   2+ and symmetric all extremities   Skin:   Skin color, texture, turgor normal, no rashes or lesions       Diagnostic Data:  Procedures  Lab Results   Component Value Date    TRIG 58 03/10/2020    HDL 56 03/10/2020     Lab Results   Component Value Date    GLUCOSE 99 03/10/2020    BUN 11 03/10/2020    CREATININE 0.61 03/10/2020     03/10/2020    K 4.8 03/10/2020     03/10/2020    CO2 23.9 03/10/2020     No results found for: HGBA1C  No results found for: WBC, HGB, HCT, PLT    Allergies  No Known Allergies    Current Medications    Current Outpatient Medications:   •  aspirin 81 MG EC tablet, Take 81 mg by mouth Daily., Disp: , Rfl:   •  atorvastatin (LIPITOR) 20 MG tablet, Take 1 tablet by mouth Daily., Disp: 90 tablet, Rfl: 3  •  Calcium Citrate (CITRACAL PO), Take 2 tablets by mouth 2 (Two) Times a Day., Disp: , Rfl:   •  carvedilol (COREG) 6.25 MG tablet, TAKE ONE TABLET BY MOUTH EVERY MORNING AND TAKE TWO TABLETS BY MOUTH EVERY EVENING WITH A MEAL, Disp: 90 tablet, Rfl: 3  •  Collagen Hydrolysate powder, , Disp: , Rfl:   •  nitroglycerin (NITROSTAT) 0.4 MG SL tablet, Place 1 tablet under the tongue Every 5 (Five) Minutes As Needed for Chest Pain. Take no more than 3 doses in 15 minutes., Disp: 25 tablet, Rfl: 3  •  valsartan (DIOVAN) 40 MG tablet, Take 1 tablet by  mouth Daily., Disp: 90 tablet, Rfl: 3          ROS  Review of Systems   Cardiovascular: Positive for dyspnea on exertion.   Respiratory: Positive for shortness of breath.          SOCIAL HX  Social History     Socioeconomic History   • Marital status:      Spouse name: Not on file   • Number of children: Not on file   • Years of education: Not on file   • Highest education level: Not on file   Tobacco Use   • Smoking status: Former Smoker     Packs/day: 1.00     Types: Cigarettes     Quit date:      Years since quittin.7   • Smokeless tobacco: Never Used   Substance and Sexual Activity   • Alcohol use: Yes     Alcohol/week: 1.0 - 2.0 standard drinks     Types: 1 - 2 Standard drinks or equivalent per week     Frequency: Monthly or less     Drinks per session: 1 or 2     Binge frequency: Never     Comment: occas   • Drug use: No   • Sexual activity: Defer       FAMILY HX  Family History   Problem Relation Age of Onset   • Hypertension Mother    • No Known Problems Father    • No Known Problems Sister    • No Known Problems Sister    • No Known Problems Sister              Aleks Richard III, MD, FACC

## 2020-10-13 ENCOUNTER — LAB (OUTPATIENT)
Dept: LAB | Facility: HOSPITAL | Age: 65
End: 2020-10-13

## 2020-10-13 PROCEDURE — 36415 COLL VENOUS BLD VENIPUNCTURE: CPT | Performed by: INTERNAL MEDICINE

## 2020-10-13 PROCEDURE — 80053 COMPREHEN METABOLIC PANEL: CPT | Performed by: INTERNAL MEDICINE

## 2020-10-13 PROCEDURE — 80061 LIPID PANEL: CPT | Performed by: INTERNAL MEDICINE

## 2020-10-14 ENCOUNTER — TELEPHONE (OUTPATIENT)
Dept: CARDIOLOGY | Facility: CLINIC | Age: 65
End: 2020-10-14

## 2020-10-14 LAB
ALBUMIN SERPL-MCNC: 4.2 G/DL (ref 3.5–5.2)
ALBUMIN/GLOB SERPL: 1.8 G/DL
ALP SERPL-CCNC: 44 U/L (ref 39–117)
ALT SERPL W P-5'-P-CCNC: 17 U/L (ref 1–33)
ANION GAP SERPL CALCULATED.3IONS-SCNC: 5.2 MMOL/L (ref 5–15)
AST SERPL-CCNC: 17 U/L (ref 1–32)
BILIRUB SERPL-MCNC: 0.3 MG/DL (ref 0–1.2)
BUN SERPL-MCNC: 18 MG/DL (ref 8–23)
BUN/CREAT SERPL: 32.7 (ref 7–25)
CALCIUM SPEC-SCNC: 9.5 MG/DL (ref 8.6–10.5)
CHLORIDE SERPL-SCNC: 103 MMOL/L (ref 98–107)
CHOLEST SERPL-MCNC: 123 MG/DL (ref 0–200)
CO2 SERPL-SCNC: 28.8 MMOL/L (ref 22–29)
CREAT SERPL-MCNC: 0.55 MG/DL (ref 0.57–1)
GFR SERPL CREATININE-BSD FRML MDRD: 111 ML/MIN/1.73
GLOBULIN UR ELPH-MCNC: 2.3 GM/DL
GLUCOSE SERPL-MCNC: 88 MG/DL (ref 65–99)
HDLC SERPL-MCNC: 53 MG/DL (ref 40–60)
LDLC SERPL CALC-MCNC: 56 MG/DL (ref 0–100)
LDLC/HDLC SERPL: 1.06 {RATIO}
POTASSIUM SERPL-SCNC: 4.5 MMOL/L (ref 3.5–5.2)
PROT SERPL-MCNC: 6.5 G/DL (ref 6–8.5)
SODIUM SERPL-SCNC: 137 MMOL/L (ref 136–145)
TRIGL SERPL-MCNC: 69 MG/DL (ref 0–150)
VLDLC SERPL-MCNC: 14 MG/DL (ref 5–40)

## 2020-10-14 NOTE — TELEPHONE ENCOUNTER
----- Message from Aleks Richard III, MD sent at 10/14/2020 10:50 AM EDT -----  Cholesterol numbers look fantastic, continue current medications.

## 2021-02-17 RX ORDER — CARVEDILOL 6.25 MG/1
TABLET ORAL
Qty: 90 TABLET | Refills: 5 | Status: SHIPPED | OUTPATIENT
Start: 2021-02-17 | End: 2021-08-20

## 2021-08-20 RX ORDER — CARVEDILOL 6.25 MG/1
TABLET ORAL
Qty: 270 TABLET | Refills: 1 | Status: SHIPPED | OUTPATIENT
Start: 2021-08-20 | End: 2022-03-03

## 2021-09-24 RX ORDER — ATORVASTATIN CALCIUM 20 MG/1
TABLET, FILM COATED ORAL
Qty: 90 TABLET | Refills: 1 | Status: SHIPPED | OUTPATIENT
Start: 2021-09-24 | End: 2022-04-12

## 2021-09-28 ENCOUNTER — OFFICE VISIT (OUTPATIENT)
Dept: CARDIOLOGY | Facility: CLINIC | Age: 66
End: 2021-09-28

## 2021-09-28 ENCOUNTER — LAB (OUTPATIENT)
Dept: LAB | Facility: HOSPITAL | Age: 66
End: 2021-09-28

## 2021-09-28 VITALS
BODY MASS INDEX: 23.9 KG/M2 | WEIGHT: 140 LBS | HEART RATE: 58 BPM | OXYGEN SATURATION: 97 % | DIASTOLIC BLOOD PRESSURE: 98 MMHG | SYSTOLIC BLOOD PRESSURE: 140 MMHG | HEIGHT: 64 IN

## 2021-09-28 DIAGNOSIS — E78.2 MIXED HYPERLIPIDEMIA: ICD-10-CM

## 2021-09-28 DIAGNOSIS — R53.83 FATIGUE, UNSPECIFIED TYPE: ICD-10-CM

## 2021-09-28 DIAGNOSIS — I10 ESSENTIAL HYPERTENSION: ICD-10-CM

## 2021-09-28 DIAGNOSIS — I25.10 CORONARY ARTERY DISEASE INVOLVING NATIVE CORONARY ARTERY OF NATIVE HEART WITHOUT ANGINA PECTORIS: Primary | ICD-10-CM

## 2021-09-28 LAB
BASOPHILS # BLD AUTO: 0.04 10*3/MM3 (ref 0–0.2)
BASOPHILS NFR BLD AUTO: 0.6 % (ref 0–1.5)
DEPRECATED RDW RBC AUTO: 41.3 FL (ref 37–54)
EOSINOPHIL # BLD AUTO: 0.12 10*3/MM3 (ref 0–0.4)
EOSINOPHIL NFR BLD AUTO: 1.9 % (ref 0.3–6.2)
ERYTHROCYTE [DISTWIDTH] IN BLOOD BY AUTOMATED COUNT: 11.7 % (ref 12.3–15.4)
HCT VFR BLD AUTO: 41.1 % (ref 34–46.6)
HGB BLD-MCNC: 14.1 G/DL (ref 12–15.9)
IMM GRANULOCYTES # BLD AUTO: 0.02 10*3/MM3 (ref 0–0.05)
IMM GRANULOCYTES NFR BLD AUTO: 0.3 % (ref 0–0.5)
LYMPHOCYTES # BLD AUTO: 2.35 10*3/MM3 (ref 0.7–3.1)
LYMPHOCYTES NFR BLD AUTO: 37.2 % (ref 19.6–45.3)
MCH RBC QN AUTO: 33.3 PG (ref 26.6–33)
MCHC RBC AUTO-ENTMCNC: 34.3 G/DL (ref 31.5–35.7)
MCV RBC AUTO: 96.9 FL (ref 79–97)
MONOCYTES # BLD AUTO: 0.72 10*3/MM3 (ref 0.1–0.9)
MONOCYTES NFR BLD AUTO: 11.4 % (ref 5–12)
NEUTROPHILS NFR BLD AUTO: 3.06 10*3/MM3 (ref 1.7–7)
NEUTROPHILS NFR BLD AUTO: 48.6 % (ref 42.7–76)
NRBC BLD AUTO-RTO: 0 /100 WBC (ref 0–0.2)
PLATELET # BLD AUTO: 212 10*3/MM3 (ref 140–450)
PMV BLD AUTO: 11.4 FL (ref 6–12)
RBC # BLD AUTO: 4.24 10*6/MM3 (ref 3.77–5.28)
WBC # BLD AUTO: 6.31 10*3/MM3 (ref 3.4–10.8)

## 2021-09-28 PROCEDURE — 82306 VITAMIN D 25 HYDROXY: CPT | Performed by: INTERNAL MEDICINE

## 2021-09-28 PROCEDURE — 93000 ELECTROCARDIOGRAM COMPLETE: CPT | Performed by: INTERNAL MEDICINE

## 2021-09-28 PROCEDURE — 99214 OFFICE O/P EST MOD 30 MIN: CPT | Performed by: INTERNAL MEDICINE

## 2021-09-28 PROCEDURE — 80053 COMPREHEN METABOLIC PANEL: CPT | Performed by: INTERNAL MEDICINE

## 2021-09-28 PROCEDURE — 36415 COLL VENOUS BLD VENIPUNCTURE: CPT | Performed by: INTERNAL MEDICINE

## 2021-09-28 PROCEDURE — 84443 ASSAY THYROID STIM HORMONE: CPT | Performed by: INTERNAL MEDICINE

## 2021-09-28 PROCEDURE — 80061 LIPID PANEL: CPT | Performed by: INTERNAL MEDICINE

## 2021-09-28 PROCEDURE — 85025 COMPLETE CBC W/AUTO DIFF WBC: CPT | Performed by: INTERNAL MEDICINE

## 2021-09-28 RX ORDER — VALSARTAN 40 MG/1
40 TABLET ORAL DAILY
Qty: 90 TABLET | Refills: 3 | Status: SHIPPED | OUTPATIENT
Start: 2021-09-28 | End: 2022-10-11 | Stop reason: SDUPTHER

## 2021-09-28 NOTE — PROGRESS NOTES
[facility] at [facility]  Office visit  Taya Russ  1955    There is no work phone number on file.    VISIT DATE:  9/28/2021    PCP: Provider, Thelma Known  [facility] [id number]    CC:  Chief Complaint   Patient presents with   • Essential Hypertension   • Coronary Artery Disease       Previous cardiac studies and procedures:  June 11, 2019 cardiac catheterization: LAD proximal 30 to 40% stenosis, left circumflex luminal irregularities, 20 to 30% RCA, EF 60%, LVEDP 10, localized area of hypokinesis and dyskinesis in the inferior wall.    November 2019 cardiac MRI: Small mid inferior wall transmural infarct, EF 60%    ASSESSMENT:   Diagnosis Plan   1. Coronary artery disease involving native coronary artery of native heart without angina pectoris  CBC & Differential   2. Essential hypertension  TSH   3. Mixed hyperlipidemia  Comprehensive Metabolic Panel    Lipid Panel   4. Fatigue, unspecified type  Vitamin D 25 Hydroxy       PLAN:  Hypertension: Goal less than 130/80 mmHg.  Currently well controlled.  Continue combination of carvedilol and valsartan.  Discussed potentially switching over to either Bystolic or nebivolol with nightly dosing if symptoms of daytime fatigue worsen.    Coronary disease: Unclear etiology for inferior myocardial infarction, potential etiologies include prolonged vasospasm, embolic event, scad.  No obvious CAD noted on cardiac catheterization, angiographically aired region between the RPDA and the distal RPL potentially consistent with a nearly flush occluded RPL branch.  Currently asymptomatic.  Continue aspirin, statin and afterload reduction.    Hyperlipidemia: Goal LDL less than 70.  Previously well controlled.  Repeat fasting lipid panel pending.  Currently on atorvastatin 20 mg p.o. Daily.    Subjective  Interval assessment: Blood pressures running less than 130/80 mmHg.  She is compliant with medical therapy.  Maintaining an active lifestyle  without limitation, some mild fatigue but otherwise no limitations..  Denies easy bruising or bleeding complications on aspirin.  Denies myalgias on statin therapy.    Initial presentation:64-year-old previously healthy female who developed moderate severe headaches with associated precordial chest discomfort on June 11.  Evaluation by EMS revealed severe hypertension with blood pressures over 200/100 mmHg, and dynamic repolarization abnormalities concerning for an acute inferior ST elevation myocardial infarction.  She underwent an urgent cardiac catheterization at [facility] which only revealed mild nonobstructive coronary atherosclerosis.  Angiogram did reveal normal EF with inferior hypokinesis.  Initial EKGs from [facility] on the day of her heart catheterization were unremarkable.  Follow-up EKG on June 12 revealed developing inferior T wave inversions with very subtle inferior ST elevation and no significant Q waves.  EKG today reveals persistent inferior T wave inversions which have now extended to V5 and V6 with the loss of R wave in lead III.  Troponin I of 11.1 on June 12.  CTA chest during hospitalization was unremarkable.  Echo not performed during this hospitalization.  She was treated with dual antiplatelet therapy, high intensity statin therapy and a combination of carvedilol and valsartan for suspected acute coronary syndrome.  Possible intracoronary spasm versus thrombosis which had resolved prior to angiogram or possible hypertensive urgency with associated regional wall motion abnormalities and EKG changes.  She has been stable on her current medical therapy with only mild fatigue.  Blood pressures are now consistently running less than 135/85 mmHg.  No further episodes of chest discomfort.  She has intermittent muscular skeletal tension headaches which are relieved and prevented with monthly deep tissue massage.     Her  passed away from complications from a ruptured gastric ulcer, she  "is concerned about the long-term risk factors of aspirin therapy.    PHYSICAL EXAMINATION:  Vitals:    09/28/21 0925   BP: 140/98   BP Location: Left arm   Patient Position: Sitting   Cuff Size: Adult   Pulse: 58   SpO2: 97%   Weight: 63.5 kg (140 lb)   Height: 162.6 cm (64\")     General Appearance:    Alert, cooperative, no distress, appears stated age   Head:    Normocephalic, without obvious abnormality, atraumatic   Eyes:    conjunctiva/corneas clear   Nose:   Nares normal, septum midline, mucosa normal, no drainage   Throat:   Lips, teeth and gums normal   Neck:   Supple, symmetrical, trachea midline, no carotid    bruit or JVD   Lungs:     Clear to auscultation bilaterally, respirations unlabored   Chest Wall:    No tenderness or deformity    Heart:    Regular rate and rhythm, S1 and S2 normal, no murmur, rub   or gallop, normal carotid impulse bilaterally without bruit.   Abdomen:     Soft, non-tender   Extremities:   Extremities normal, atraumatic, no cyanosis or edema   Pulses:   2+ and symmetric all extremities   Skin:   Skin color, texture, turgor normal, no rashes or lesions       Diagnostic Data:    ECG 12 Lead    Date/Time: 9/28/2021 9:42 AM  Performed by: Aleks Richard III, MD  Authorized by: Aleks Richard III, MD   Comparison: compared with previous ECG from 6/27/2019  Comparison to previous ECG: Inferior T wave inversions no longer present  Rhythm: sinus rhythm  Conduction: 1st degree AV block    Clinical impression: normal ECG          Lab Results   Component Value Date    TRIG 69 10/13/2020    HDL 53 10/13/2020     Lab Results   Component Value Date    GLUCOSE 88 10/13/2020    BUN 18 10/13/2020    CREATININE 0.55 (L) 10/13/2020     10/13/2020    K 4.5 10/13/2020     10/13/2020    CO2 28.8 10/13/2020     No results found for: HGBA1C  No results found for: WBC, HGB, HCT, PLT    Allergies  No Known Allergies    Current Medications    Current Outpatient Medications:   •  aspirin 81 MG EC " tablet, Take 81 mg by mouth Daily., Disp: , Rfl:   •  atorvastatin (LIPITOR) 20 MG tablet, TAKE ONE TABLET BY MOUTH DAILY, Disp: 90 tablet, Rfl: 1  •  Calcium Citrate (CITRACAL PO), Take 2 tablets by mouth 2 (Two) Times a Day., Disp: , Rfl:   •  carvedilol (COREG) 6.25 MG tablet, TAKE ONE TABLET BY MOUTH EVERY MORNING AND TAKE TWO TABLETS BY MOUTH EVERY EVENING WITH A MEAL, Disp: 270 tablet, Rfl: 1  •  Collagen Hydrolysate powder, QAM, Disp: , Rfl:   •  nitroglycerin (NITROSTAT) 0.4 MG SL tablet, Place 1 tablet under the tongue Every 5 (Five) Minutes As Needed for Chest Pain. Take no more than 3 doses in 15 minutes., Disp: 25 tablet, Rfl: 3  •  Probiotic Product (PROBIOTIC ADVANCED PO), Take 1 tablet by mouth Daily., Disp: , Rfl:   •  valsartan (DIOVAN) 40 MG tablet, Take 1 tablet by mouth Daily., Disp: 90 tablet, Rfl: 3          ROS  Review of Systems   Cardiovascular: Positive for dyspnea on exertion.   Respiratory: Positive for shortness of breath.          SOCIAL HX  Social History     Socioeconomic History   • Marital status:      Spouse name: Not on file   • Number of children: Not on file   • Years of education: Not on file   • Highest education level: Not on file   Tobacco Use   • Smoking status: Former Smoker     Packs/day: 1.00     Types: Cigarettes     Quit date:      Years since quittin.7   • Smokeless tobacco: Never Used   Vaping Use   • Vaping Use: Never used   Substance and Sexual Activity   • Alcohol use: Yes     Alcohol/week: 1.0 - 2.0 standard drinks     Types: 1 - 2 Standard drinks or equivalent per week     Comment: occas   • Drug use: No   • Sexual activity: Defer       FAMILY HX  Family History   Problem Relation Age of Onset   • Hypertension Mother    • No Known Problems Father    • No Known Problems Sister    • No Known Problems Sister    • No Known Problems Sister              Aleks Richard III, MD, [facility]

## 2021-09-29 ENCOUNTER — TELEPHONE (OUTPATIENT)
Dept: CARDIOLOGY | Facility: CLINIC | Age: 66
End: 2021-09-29

## 2021-09-29 LAB
25(OH)D3 SERPL-MCNC: 65.6 NG/ML (ref 30–100)
ALBUMIN SERPL-MCNC: 4.7 G/DL (ref 3.5–5.2)
ALBUMIN/GLOB SERPL: 2.2 G/DL
ALP SERPL-CCNC: 43 U/L (ref 39–117)
ALT SERPL W P-5'-P-CCNC: 17 U/L (ref 1–33)
ANION GAP SERPL CALCULATED.3IONS-SCNC: 10 MMOL/L (ref 5–15)
AST SERPL-CCNC: 21 U/L (ref 1–32)
BILIRUB SERPL-MCNC: 0.5 MG/DL (ref 0–1.2)
BUN SERPL-MCNC: 14 MG/DL (ref 8–23)
BUN/CREAT SERPL: 23.3 (ref 7–25)
CALCIUM SPEC-SCNC: 9.6 MG/DL (ref 8.6–10.5)
CHLORIDE SERPL-SCNC: 105 MMOL/L (ref 98–107)
CHOLEST SERPL-MCNC: 142 MG/DL (ref 0–200)
CO2 SERPL-SCNC: 26 MMOL/L (ref 22–29)
CREAT SERPL-MCNC: 0.6 MG/DL (ref 0.57–1)
GFR SERPL CREATININE-BSD FRML MDRD: 100 ML/MIN/1.73
GLOBULIN UR ELPH-MCNC: 2.1 GM/DL
GLUCOSE SERPL-MCNC: 93 MG/DL (ref 65–99)
HDLC SERPL-MCNC: 70 MG/DL (ref 40–60)
LDLC SERPL CALC-MCNC: 57 MG/DL (ref 0–100)
LDLC/HDLC SERPL: 0.81 {RATIO}
POTASSIUM SERPL-SCNC: 4.5 MMOL/L (ref 3.5–5.2)
PROT SERPL-MCNC: 6.8 G/DL (ref 6–8.5)
SODIUM SERPL-SCNC: 141 MMOL/L (ref 136–145)
TRIGL SERPL-MCNC: 78 MG/DL (ref 0–150)
TSH SERPL DL<=0.05 MIU/L-ACNC: 1.65 UIU/ML (ref 0.27–4.2)
VLDLC SERPL-MCNC: 15 MG/DL (ref 5–40)

## 2021-09-29 NOTE — TELEPHONE ENCOUNTER
----- Message from Aleks Richard III, MD sent at 9/29/2021  9:36 AM EDT -----  All your labs look fantastic.

## 2022-03-03 RX ORDER — CARVEDILOL 6.25 MG/1
TABLET ORAL
Qty: 270 TABLET | Refills: 1 | Status: SHIPPED | OUTPATIENT
Start: 2022-03-03 | End: 2022-09-08

## 2022-04-12 RX ORDER — ATORVASTATIN CALCIUM 20 MG/1
TABLET, FILM COATED ORAL
Qty: 90 TABLET | Refills: 1 | Status: SHIPPED | OUTPATIENT
Start: 2022-04-12 | End: 2022-10-11 | Stop reason: SDUPTHER

## 2022-09-08 RX ORDER — CARVEDILOL 6.25 MG/1
TABLET ORAL
Qty: 270 TABLET | Refills: 0 | Status: SHIPPED | OUTPATIENT
Start: 2022-09-08 | End: 2022-10-11

## 2022-10-11 ENCOUNTER — LAB (OUTPATIENT)
Dept: LAB | Facility: HOSPITAL | Age: 67
End: 2022-10-11

## 2022-10-11 ENCOUNTER — OFFICE VISIT (OUTPATIENT)
Dept: CARDIOLOGY | Facility: CLINIC | Age: 67
End: 2022-10-11

## 2022-10-11 VITALS
OXYGEN SATURATION: 99 % | WEIGHT: 137 LBS | BODY MASS INDEX: 23.39 KG/M2 | HEIGHT: 64 IN | SYSTOLIC BLOOD PRESSURE: 112 MMHG | HEART RATE: 60 BPM | DIASTOLIC BLOOD PRESSURE: 76 MMHG

## 2022-10-11 DIAGNOSIS — I10 ESSENTIAL HYPERTENSION: ICD-10-CM

## 2022-10-11 DIAGNOSIS — I25.10 CORONARY ARTERY DISEASE INVOLVING NATIVE CORONARY ARTERY OF NATIVE HEART WITHOUT ANGINA PECTORIS: Primary | ICD-10-CM

## 2022-10-11 DIAGNOSIS — E78.2 MIXED HYPERLIPIDEMIA: ICD-10-CM

## 2022-10-11 PROCEDURE — 80053 COMPREHEN METABOLIC PANEL: CPT | Performed by: INTERNAL MEDICINE

## 2022-10-11 PROCEDURE — 36415 COLL VENOUS BLD VENIPUNCTURE: CPT | Performed by: INTERNAL MEDICINE

## 2022-10-11 PROCEDURE — 85025 COMPLETE CBC W/AUTO DIFF WBC: CPT | Performed by: INTERNAL MEDICINE

## 2022-10-11 PROCEDURE — 99214 OFFICE O/P EST MOD 30 MIN: CPT | Performed by: INTERNAL MEDICINE

## 2022-10-11 PROCEDURE — 80061 LIPID PANEL: CPT | Performed by: INTERNAL MEDICINE

## 2022-10-11 RX ORDER — VALSARTAN 40 MG/1
40 TABLET ORAL DAILY
Qty: 90 TABLET | Refills: 3 | Status: SHIPPED | OUTPATIENT
Start: 2022-10-11

## 2022-10-11 RX ORDER — ATORVASTATIN CALCIUM 20 MG/1
20 TABLET, FILM COATED ORAL DAILY
Qty: 90 TABLET | Refills: 3 | Status: SHIPPED | OUTPATIENT
Start: 2022-10-11

## 2022-10-11 NOTE — PROGRESS NOTES
[facility] at [facility]  Office visit  Taya Russ  1955    There is no work phone number on file.    VISIT DATE:  10/11/2022    PCP: Provider, No Known  [facility] [id number]    CC:  Chief Complaint   Patient presents with   • Coronary artery disease involving native coronary artery of       Previous cardiac studies and procedures:  June 11, 2019 cardiac catheterization: LAD proximal 30 to 40% stenosis, left circumflex luminal irregularities, 20 to 30% RCA, EF 60%, LVEDP 10, localized area of hypokinesis and dyskinesis in the inferior wall.    November 2019 cardiac MRI: Small mid inferior wall transmural infarct, EF 60%    ASSESSMENT:   Diagnosis Plan   1. Coronary artery disease involving native coronary artery of native heart without angina pectoris  CBC & Differential      2. Essential hypertension  Comprehensive Metabolic Panel      3. Mixed hyperlipidemia  Lipid Panel          PLAN:  Hypertension: Goal less than 130/80 mmHg.  Currently well controlled.  Continue valsartan.  Weaning off beta-blockade due to mild fatigue.    Coronary disease: Unclear etiology for inferior myocardial infarction, potential etiologies include prolonged vasospasm, embolic event, scad.  No obvious CAD noted on cardiac catheterization, angiographically aired region between the RPDA and the distal RPL potentially consistent with a nearly flush occluded RPL branch.  Currently asymptomatic.  Continue aspirin, statin and afterload reduction.  Normal EF, has received most of the benefit of beta-blockade following ACS, ongoing fatigue, weaning off carvedilol over the next 4 weeks.    Hyperlipidemia: Goal LDL less than 70.  Previously well controlled.  Repeat fasting lipid panel pending.  Currently on atorvastatin 20 mg p.o. Daily.    Subjective  Interval assessment: Blood pressures running less than 130/80 mmHg.  She is compliant with medical therapy.  Maintaining an active lifestyle without  limitation, some mild fatigue but otherwise no limitations..  Denies easy bruising or bleeding complications on aspirin.  Denies myalgias on statin therapy.    Initial presentation:64-year-old previously healthy female who developed moderate severe headaches with associated precordial chest discomfort on June 11.  Evaluation by EMS revealed severe hypertension with blood pressures over 200/100 mmHg, and dynamic repolarization abnormalities concerning for an acute inferior ST elevation myocardial infarction.  She underwent an urgent cardiac catheterization at [facility] which only revealed mild nonobstructive coronary atherosclerosis.  Angiogram did reveal normal EF with inferior hypokinesis.  Initial EKGs from [facility] on the day of her heart catheterization were unremarkable.  Follow-up EKG on June 12 revealed developing inferior T wave inversions with very subtle inferior ST elevation and no significant Q waves.  EKG today reveals persistent inferior T wave inversions which have now extended to V5 and V6 with the loss of R wave in lead III.  Troponin I of 11.1 on June 12.  CTA chest during hospitalization was unremarkable.  Echo not performed during this hospitalization.  She was treated with dual antiplatelet therapy, high intensity statin therapy and a combination of carvedilol and valsartan for suspected acute coronary syndrome.  Possible intracoronary spasm versus thrombosis which had resolved prior to angiogram or possible hypertensive urgency with associated regional wall motion abnormalities and EKG changes.  She has been stable on her current medical therapy with only mild fatigue.  Blood pressures are now consistently running less than 135/85 mmHg.  No further episodes of chest discomfort.  She has intermittent muscular skeletal tension headaches which are relieved and prevented with monthly deep tissue massage.     Her  passed away from complications from a ruptured gastric ulcer, she is  "concerned about the long-term risk factors of aspirin therapy.    PHYSICAL EXAMINATION:  Vitals:    10/11/22 0941   BP: 112/76   BP Location: Right arm   Patient Position: Sitting   Pulse: 60   SpO2: 99%   Weight: 62.1 kg (137 lb)   Height: 162.6 cm (64\")     General Appearance:    Alert, cooperative, no distress, appears stated age   Head:    Normocephalic, without obvious abnormality, atraumatic   Eyes:    conjunctiva/corneas clear   Nose:   Nares normal, septum midline, mucosa normal, no drainage   Throat:   Lips, teeth and gums normal   Neck:   Supple, symmetrical, trachea midline, no carotid    bruit or JVD   Lungs:     Clear to auscultation bilaterally, respirations unlabored   Chest Wall:    No tenderness or deformity    Heart:    Regular rate and rhythm, S1 and S2 normal, no murmur, rub   or gallop, normal carotid impulse bilaterally without bruit.   Abdomen:     Soft, non-tender   Extremities:   Extremities normal, atraumatic, no cyanosis or edema   Pulses:   2+ and symmetric all extremities   Skin:   Skin color, texture, turgor normal, no rashes or lesions       Diagnostic Data:  Procedures  Lab Results   Component Value Date    TRIG 78 09/28/2021    HDL 70 (H) 09/28/2021     Lab Results   Component Value Date    GLUCOSE 93 09/28/2021    BUN 14 09/28/2021    CREATININE 0.60 09/28/2021     09/28/2021    K 4.5 09/28/2021     09/28/2021    CO2 26.0 09/28/2021     No results found for: HGBA1C  Lab Results   Component Value Date    WBC 6.31 09/28/2021    HGB 14.1 09/28/2021    HCT 41.1 09/28/2021     09/28/2021       Allergies  No Known Allergies    Current Medications    Current Outpatient Medications:   •  aspirin 81 MG EC tablet, Take 81 mg by mouth Daily., Disp: , Rfl:   •  atorvastatin (LIPITOR) 20 MG tablet, Take 1 tablet by mouth Daily., Disp: 90 tablet, Rfl: 3  •  Calcium Citrate (CITRACAL PO), Take 2 tablets by mouth 2 (Two) Times a Day., Disp: , Rfl:   •  Collagen Hydrolysate " powder, QAM, Disp: , Rfl:   •  nitroglycerin (NITROSTAT) 0.4 MG SL tablet, Place 1 tablet under the tongue Every 5 (Five) Minutes As Needed for Chest Pain. Take no more than 3 doses in 15 minutes., Disp: 25 tablet, Rfl: 3  •  valsartan (DIOVAN) 40 MG tablet, Take 1 tablet by mouth Daily., Disp: 90 tablet, Rfl: 3          ROS  Review of Systems   Cardiovascular: Positive for dyspnea on exertion.   Respiratory: Positive for shortness of breath.          SOCIAL HX  Social History     Socioeconomic History   • Marital status:    Tobacco Use   • Smoking status: Former     Packs/day: 1.00     Types: Cigarettes     Quit date: 1986     Years since quittin.8   • Smokeless tobacco: Never   Vaping Use   • Vaping Use: Never used   Substance and Sexual Activity   • Alcohol use: Yes     Comment: occas   • Drug use: No   • Sexual activity: Defer       FAMILY HX  Family History   Problem Relation Age of Onset   • Hypertension Mother    • No Known Problems Father    • No Known Problems Sister    • No Known Problems Sister    • No Known Problems Sister              Aleks Richard III, MD, FACC

## 2022-10-12 ENCOUNTER — TELEPHONE (OUTPATIENT)
Dept: CARDIOLOGY | Facility: CLINIC | Age: 67
End: 2022-10-12

## 2022-10-12 LAB
ALBUMIN SERPL-MCNC: 4.5 G/DL (ref 3.5–5.2)
ALBUMIN/GLOB SERPL: 2.3 G/DL
ALP SERPL-CCNC: 44 U/L (ref 39–117)
ALT SERPL W P-5'-P-CCNC: 13 U/L (ref 1–33)
ANION GAP SERPL CALCULATED.3IONS-SCNC: 12 MMOL/L (ref 5–15)
AST SERPL-CCNC: 19 U/L (ref 1–32)
BASOPHILS # BLD AUTO: 0.04 10*3/MM3 (ref 0–0.2)
BASOPHILS NFR BLD AUTO: 0.6 % (ref 0–1.5)
BILIRUB SERPL-MCNC: 0.7 MG/DL (ref 0–1.2)
BUN SERPL-MCNC: 12 MG/DL (ref 8–23)
BUN/CREAT SERPL: 21.4 (ref 7–25)
CALCIUM SPEC-SCNC: 9.3 MG/DL (ref 8.6–10.5)
CHLORIDE SERPL-SCNC: 103 MMOL/L (ref 98–107)
CHOLEST SERPL-MCNC: 128 MG/DL (ref 0–200)
CO2 SERPL-SCNC: 25 MMOL/L (ref 22–29)
CREAT SERPL-MCNC: 0.56 MG/DL (ref 0.57–1)
DEPRECATED RDW RBC AUTO: 40.9 FL (ref 37–54)
EGFRCR SERPLBLD CKD-EPI 2021: 100.8 ML/MIN/1.73
EOSINOPHIL # BLD AUTO: 0.1 10*3/MM3 (ref 0–0.4)
EOSINOPHIL NFR BLD AUTO: 1.6 % (ref 0.3–6.2)
ERYTHROCYTE [DISTWIDTH] IN BLOOD BY AUTOMATED COUNT: 12 % (ref 12.3–15.4)
GLOBULIN UR ELPH-MCNC: 2 GM/DL
GLUCOSE SERPL-MCNC: 88 MG/DL (ref 65–99)
HCT VFR BLD AUTO: 38.4 % (ref 34–46.6)
HDLC SERPL-MCNC: 61 MG/DL (ref 40–60)
HGB BLD-MCNC: 13.2 G/DL (ref 12–15.9)
IMM GRANULOCYTES # BLD AUTO: 0.02 10*3/MM3 (ref 0–0.05)
IMM GRANULOCYTES NFR BLD AUTO: 0.3 % (ref 0–0.5)
LDLC SERPL CALC-MCNC: 52 MG/DL (ref 0–100)
LDLC/HDLC SERPL: 0.86 {RATIO}
LYMPHOCYTES # BLD AUTO: 2.46 10*3/MM3 (ref 0.7–3.1)
LYMPHOCYTES NFR BLD AUTO: 39.3 % (ref 19.6–45.3)
MCH RBC QN AUTO: 32.8 PG (ref 26.6–33)
MCHC RBC AUTO-ENTMCNC: 34.4 G/DL (ref 31.5–35.7)
MCV RBC AUTO: 95.3 FL (ref 79–97)
MONOCYTES # BLD AUTO: 0.64 10*3/MM3 (ref 0.1–0.9)
MONOCYTES NFR BLD AUTO: 10.2 % (ref 5–12)
NEUTROPHILS NFR BLD AUTO: 3 10*3/MM3 (ref 1.7–7)
NEUTROPHILS NFR BLD AUTO: 48 % (ref 42.7–76)
NRBC BLD AUTO-RTO: 0 /100 WBC (ref 0–0.2)
PLATELET # BLD AUTO: 206 10*3/MM3 (ref 140–450)
PMV BLD AUTO: 10.8 FL (ref 6–12)
POTASSIUM SERPL-SCNC: 4.2 MMOL/L (ref 3.5–5.2)
PROT SERPL-MCNC: 6.5 G/DL (ref 6–8.5)
RBC # BLD AUTO: 4.03 10*6/MM3 (ref 3.77–5.28)
SODIUM SERPL-SCNC: 140 MMOL/L (ref 136–145)
TRIGL SERPL-MCNC: 73 MG/DL (ref 0–150)
VLDLC SERPL-MCNC: 15 MG/DL (ref 5–40)
WBC NRBC COR # BLD: 6.26 10*3/MM3 (ref 3.4–10.8)

## 2022-10-12 NOTE — TELEPHONE ENCOUNTER
----- Message from Aleks Richard III, MD sent at 10/12/2022 10:14 AM EDT -----  Lab work looks great.

## 2023-10-24 ENCOUNTER — OFFICE VISIT (OUTPATIENT)
Dept: CARDIOLOGY | Facility: CLINIC | Age: 68
End: 2023-10-24
Payer: MEDICARE

## 2023-10-24 VITALS
DIASTOLIC BLOOD PRESSURE: 70 MMHG | HEIGHT: 64 IN | OXYGEN SATURATION: 97 % | BODY MASS INDEX: 23.05 KG/M2 | SYSTOLIC BLOOD PRESSURE: 124 MMHG | WEIGHT: 135 LBS | HEART RATE: 64 BPM

## 2023-10-24 DIAGNOSIS — I10 ESSENTIAL HYPERTENSION: ICD-10-CM

## 2023-10-24 DIAGNOSIS — I25.10 CORONARY ARTERY DISEASE INVOLVING NATIVE CORONARY ARTERY OF NATIVE HEART WITHOUT ANGINA PECTORIS: Primary | ICD-10-CM

## 2023-10-24 DIAGNOSIS — E55.9 VITAMIN D DEFICIENCY: ICD-10-CM

## 2023-10-24 DIAGNOSIS — E78.2 MIXED HYPERLIPIDEMIA: ICD-10-CM

## 2023-10-24 PROCEDURE — 99214 OFFICE O/P EST MOD 30 MIN: CPT | Performed by: INTERNAL MEDICINE

## 2023-10-24 PROCEDURE — 3078F DIAST BP <80 MM HG: CPT | Performed by: INTERNAL MEDICINE

## 2023-10-24 PROCEDURE — 3074F SYST BP LT 130 MM HG: CPT | Performed by: INTERNAL MEDICINE

## 2023-10-24 RX ORDER — VALSARTAN 40 MG/1
40 TABLET ORAL DAILY
Qty: 90 TABLET | Refills: 3 | Status: SHIPPED | OUTPATIENT
Start: 2023-10-24

## 2023-10-24 RX ORDER — ROSUVASTATIN CALCIUM 5 MG/1
5 TABLET, COATED ORAL DAILY
Qty: 90 TABLET | Refills: 3 | Status: SHIPPED | OUTPATIENT
Start: 2023-10-24

## 2023-10-24 NOTE — PROGRESS NOTES
[facility]  Office visit  Taya Russ  1955    There is no work phone number on file.    VISIT DATE:  10/24/2023    PCP: Provider, No Known  [facility] [id number]    CC:  Chief Complaint   Patient presents with    Coronary Artery Disease     1 year follow up         Previous cardiac studies and procedures:  June 11, 2019 cardiac catheterization: LAD proximal 30 to 40% stenosis, left circumflex luminal irregularities, 20 to 30% RCA, EF 60%, LVEDP 10, localized area of hypokinesis and dyskinesis in the inferior wall.    November 2019 cardiac MRI: Small mid inferior wall transmural infarct, EF 60%    ASSESSMENT:   Diagnosis Plan   1. Coronary artery disease involving native coronary artery of native heart without angina pectoris        2. Essential hypertension  CBC & Differential    Comprehensive Metabolic Panel    TSH      3. Mixed hyperlipidemia  Lipid Panel      4. Vitamin D deficiency  Vitamin D,25-Hydroxy            PLAN:  Hypertension: Goal less than 130/80 mmHg.  Currently well controlled.  Continue valsartan.      Coronary disease: Unclear etiology for inferior myocardial infarction, potential etiologies include prolonged vasospasm, embolic event, scad.  No obvious CAD noted on cardiac catheterization, angiographically aired region between the RPDA and the distal RPL potentially consistent with a nearly flush occluded RPL branch.  Currently asymptomatic.  Continue aspirin, statin and afterload reduction.  Normal EF, has received most of the benefit of beta-blockade following ACS, ongoing fatigue, weaning off carvedilol over the next 4 weeks.    Hyperlipidemia: Goal LDL less than 70.  Previously well controlled.  Repeat fasting lipid panel pending.  Discontinuing atorvastatin for the next 4 to 6 weeks to see if memory issues resolve.  Then start rosuvastatin 5 mg p.o. daily.  Follow-up lipid profile 4 weeks after initiating  rosuvastatin.    Subjective  Interval assessment: Blood pressures running less than 130/80 mmHg.  She is compliant with medical therapy.  Maintaining an active lifestyle without limitation, some mild fatigue but otherwise no limitations..  Denies easy bruising or bleeding complications on aspirin.  Denies myalgias on statin therapy.  She has noticed some intermittent forgetfulness and mild short-term memory issues, appears within normal limits.    Initial presentation:64-year-old previously healthy female who developed moderate severe headaches with associated precordial chest discomfort on June 11.  Evaluation by EMS revealed severe hypertension with blood pressures over 200/100 mmHg, and dynamic repolarization abnormalities concerning for an acute inferior ST elevation myocardial infarction.  She underwent an urgent cardiac catheterization at [facility] which only revealed mild nonobstructive coronary atherosclerosis.  Angiogram did reveal normal EF with inferior hypokinesis.  Initial EKGs from [facility] on the day of her heart catheterization were unremarkable.  Follow-up EKG on June 12 revealed developing inferior T wave inversions with very subtle inferior ST elevation and no significant Q waves.  EKG today reveals persistent inferior T wave inversions which have now extended to V5 and V6 with the loss of R wave in lead III.  Troponin I of 11.1 on June 12.  CTA chest during hospitalization was unremarkable.  Echo not performed during this hospitalization.  She was treated with dual antiplatelet therapy, high intensity statin therapy and a combination of carvedilol and valsartan for suspected acute coronary syndrome.  Possible intracoronary spasm versus thrombosis which had resolved prior to angiogram or possible hypertensive urgency with associated regional wall motion abnormalities and EKG changes.  She has been stable on her current medical therapy with only mild fatigue.  Blood pressures are now  "consistently running less than 135/85 mmHg.  No further episodes of chest discomfort.  She has intermittent muscular skeletal tension headaches which are relieved and prevented with monthly deep tissue massage.     Her  passed away from complications from a ruptured gastric ulcer, she is concerned about the long-term risk factors of aspirin therapy.    PHYSICAL EXAMINATION:  Vitals:    10/24/23 0931   BP: 124/70   BP Location: Right arm   Patient Position: Sitting   Pulse: 64   SpO2: 97%   Weight: 61.2 kg (135 lb)   Height: 162.6 cm (64\")     General Appearance:    Alert, cooperative, no distress, appears stated age   Head:    Normocephalic, without obvious abnormality, atraumatic   Eyes:    conjunctiva/corneas clear   Nose:   Nares normal, septum midline, mucosa normal, no drainage   Throat:   Lips, teeth and gums normal   Neck:   Supple, symmetrical, trachea midline, no carotid    bruit or JVD   Lungs:     Clear to auscultation bilaterally, respirations unlabored   Chest Wall:    No tenderness or deformity    Heart:    Regular rate and rhythm, S1 and S2 normal, no murmur, rub   or gallop, normal carotid impulse bilaterally without bruit.   Abdomen:     Soft, non-tender   Extremities:   Extremities normal, atraumatic, no cyanosis or edema   Pulses:   2+ and symmetric all extremities   Skin:   Skin color, texture, turgor normal, no rashes or lesions       Diagnostic Data:  Procedures  Lab Results   Component Value Date    TRIG 73 10/11/2022    HDL 61 (H) 10/11/2022     Lab Results   Component Value Date    GLUCOSE 88 10/11/2022    BUN 12 10/11/2022    CREATININE 0.56 (L) 10/11/2022     10/11/2022    K 4.2 10/11/2022     10/11/2022    CO2 25.0 10/11/2022     No results found for: \"HGBA1C\"  Lab Results   Component Value Date    WBC 6.26 10/11/2022    HGB 13.2 10/11/2022    HCT 38.4 10/11/2022     10/11/2022       Allergies  No Known Allergies    Current Medications    Current Outpatient " Medications:     aspirin 81 MG EC tablet, Take 1 tablet by mouth Daily., Disp: , Rfl:     Calcium Citrate (CITRACAL PO), Take 2 tablets by mouth 2 (Two) Times a Day., Disp: , Rfl:     Collagen Hydrolysate powder, QAM, Disp: , Rfl:     nitroglycerin (NITROSTAT) 0.4 MG SL tablet, Place 1 tablet under the tongue Every 5 (Five) Minutes As Needed for Chest Pain. Take no more than 3 doses in 15 minutes., Disp: 25 tablet, Rfl: 3    valsartan (DIOVAN) 40 MG tablet, Take 1 tablet by mouth Daily., Disp: 90 tablet, Rfl: 3    rosuvastatin (CRESTOR) 5 MG tablet, Take 1 tablet by mouth Daily., Disp: 90 tablet, Rfl: 3          ROS  Review of Systems   Cardiovascular:  Positive for dyspnea on exertion.   Respiratory:  Positive for shortness of breath.          SOCIAL HX  Social History     Socioeconomic History    Marital status:    Tobacco Use    Smoking status: Former     Packs/day: 1     Types: Cigarettes     Quit date: 1986     Years since quittin.8    Smokeless tobacco: Never   Vaping Use    Vaping Use: Never used   Substance and Sexual Activity    Alcohol use: Yes     Comment: occas    Drug use: No    Sexual activity: Defer       FAMILY HX  Family History   Problem Relation Age of Onset    Hypertension Mother     No Known Problems Father     No Known Problems Sister     No Known Problems Sister     No Known Problems Sister              Aleks Richard III, MD, FACC

## 2023-11-03 ENCOUNTER — LAB (OUTPATIENT)
Dept: LAB | Facility: HOSPITAL | Age: 68
End: 2023-11-03
Payer: MEDICARE

## 2023-11-03 DIAGNOSIS — E78.2 MIXED HYPERLIPIDEMIA: ICD-10-CM

## 2023-11-03 DIAGNOSIS — I10 ESSENTIAL HYPERTENSION: ICD-10-CM

## 2023-11-03 DIAGNOSIS — E55.9 VITAMIN D DEFICIENCY: ICD-10-CM

## 2023-11-03 LAB
25(OH)D3 SERPL-MCNC: 50.5 NG/ML (ref 30–100)
ALBUMIN SERPL-MCNC: 4.3 G/DL (ref 3.5–5.2)
ALBUMIN/GLOB SERPL: 1.8 G/DL
ALP SERPL-CCNC: 56 U/L (ref 39–117)
ALT SERPL W P-5'-P-CCNC: 21 U/L (ref 1–33)
ANION GAP SERPL CALCULATED.3IONS-SCNC: 9 MMOL/L (ref 5–15)
AST SERPL-CCNC: 23 U/L (ref 1–32)
BASOPHILS # BLD AUTO: 0.02 10*3/MM3 (ref 0–0.2)
BASOPHILS NFR BLD AUTO: 0.3 % (ref 0–1.5)
BILIRUB SERPL-MCNC: 0.3 MG/DL (ref 0–1.2)
BUN SERPL-MCNC: 14 MG/DL (ref 8–23)
BUN/CREAT SERPL: 21.9 (ref 7–25)
CALCIUM SPEC-SCNC: 9.9 MG/DL (ref 8.6–10.5)
CHLORIDE SERPL-SCNC: 104 MMOL/L (ref 98–107)
CHOLEST SERPL-MCNC: 188 MG/DL (ref 0–200)
CO2 SERPL-SCNC: 28 MMOL/L (ref 22–29)
CREAT SERPL-MCNC: 0.64 MG/DL (ref 0.57–1)
DEPRECATED RDW RBC AUTO: 41.7 FL (ref 37–54)
EGFRCR SERPLBLD CKD-EPI 2021: 97 ML/MIN/1.73
EOSINOPHIL # BLD AUTO: 0.14 10*3/MM3 (ref 0–0.4)
EOSINOPHIL NFR BLD AUTO: 2.3 % (ref 0.3–6.2)
ERYTHROCYTE [DISTWIDTH] IN BLOOD BY AUTOMATED COUNT: 12.2 % (ref 12.3–15.4)
GLOBULIN UR ELPH-MCNC: 2.4 GM/DL
GLUCOSE SERPL-MCNC: 97 MG/DL (ref 65–99)
HCT VFR BLD AUTO: 40 % (ref 34–46.6)
HDLC SERPL-MCNC: 67 MG/DL (ref 40–60)
HGB BLD-MCNC: 14 G/DL (ref 12–15.9)
IMM GRANULOCYTES # BLD AUTO: 0.01 10*3/MM3 (ref 0–0.05)
IMM GRANULOCYTES NFR BLD AUTO: 0.2 % (ref 0–0.5)
LDLC SERPL CALC-MCNC: 106 MG/DL (ref 0–100)
LDLC/HDLC SERPL: 1.57 {RATIO}
LYMPHOCYTES # BLD AUTO: 2.27 10*3/MM3 (ref 0.7–3.1)
LYMPHOCYTES NFR BLD AUTO: 37.5 % (ref 19.6–45.3)
MCH RBC QN AUTO: 32.7 PG (ref 26.6–33)
MCHC RBC AUTO-ENTMCNC: 35 G/DL (ref 31.5–35.7)
MCV RBC AUTO: 93.5 FL (ref 79–97)
MONOCYTES # BLD AUTO: 0.79 10*3/MM3 (ref 0.1–0.9)
MONOCYTES NFR BLD AUTO: 13 % (ref 5–12)
NEUTROPHILS NFR BLD AUTO: 2.83 10*3/MM3 (ref 1.7–7)
NEUTROPHILS NFR BLD AUTO: 46.7 % (ref 42.7–76)
NRBC BLD AUTO-RTO: 0 /100 WBC (ref 0–0.2)
PLATELET # BLD AUTO: 204 10*3/MM3 (ref 140–450)
PMV BLD AUTO: 10.6 FL (ref 6–12)
POTASSIUM SERPL-SCNC: 5 MMOL/L (ref 3.5–5.2)
PROT SERPL-MCNC: 6.7 G/DL (ref 6–8.5)
RBC # BLD AUTO: 4.28 10*6/MM3 (ref 3.77–5.28)
SODIUM SERPL-SCNC: 141 MMOL/L (ref 136–145)
TRIGL SERPL-MCNC: 80 MG/DL (ref 0–150)
TSH SERPL DL<=0.05 MIU/L-ACNC: 1.58 UIU/ML (ref 0.27–4.2)
VLDLC SERPL-MCNC: 15 MG/DL (ref 5–40)
WBC NRBC COR # BLD: 6.06 10*3/MM3 (ref 3.4–10.8)

## 2023-11-03 PROCEDURE — 82306 VITAMIN D 25 HYDROXY: CPT

## 2023-11-03 PROCEDURE — 84443 ASSAY THYROID STIM HORMONE: CPT

## 2023-11-03 PROCEDURE — 80061 LIPID PANEL: CPT

## 2023-11-03 PROCEDURE — 80053 COMPREHEN METABOLIC PANEL: CPT

## 2023-11-03 PROCEDURE — 36415 COLL VENOUS BLD VENIPUNCTURE: CPT

## 2023-11-03 PROCEDURE — 85025 COMPLETE CBC W/AUTO DIFF WBC: CPT

## 2023-11-06 ENCOUNTER — TELEPHONE (OUTPATIENT)
Dept: CARDIOLOGY | Facility: CLINIC | Age: 68
End: 2023-11-06
Payer: MEDICARE

## 2023-11-06 DIAGNOSIS — E78.2 MIXED HYPERLIPIDEMIA: Primary | ICD-10-CM

## 2023-11-06 RX ORDER — ROSUVASTATIN CALCIUM 5 MG/1
5 TABLET, COATED ORAL EVERY OTHER DAY
Qty: 15 TABLET | Refills: 5 | Status: SHIPPED | OUTPATIENT
Start: 2023-11-06

## 2023-11-06 NOTE — TELEPHONE ENCOUNTER
----- Message from Aleks Richard III, MD sent at 11/6/2023  8:27 AM EST -----  Back cholesterol levels did come up somewhat, otherwise laboratory evaluation is normal.  When you restart rosuvastatin, I think we can get by with only taking it every other day.  Lets plan on repeating a cholesterol profile in 6 months.

## 2024-01-04 RX ORDER — ATORVASTATIN CALCIUM 20 MG/1
20 TABLET, FILM COATED ORAL DAILY
Qty: 90 TABLET | Refills: 3 | OUTPATIENT
Start: 2024-01-04

## 2024-04-08 ENCOUNTER — TELEPHONE (OUTPATIENT)
Dept: CARDIOLOGY | Facility: CLINIC | Age: 69
End: 2024-04-08
Payer: MEDICARE

## 2024-04-08 NOTE — TELEPHONE ENCOUNTER
04/08/2024 AT 2:45 PM  SPOKE WITH PT REGARDING NEW LOCATION FOR NEXT APPOINTMENT IN NOVEMBER 2024.

## 2024-11-12 ENCOUNTER — OFFICE VISIT (OUTPATIENT)
Dept: CARDIOLOGY | Facility: CLINIC | Age: 69
End: 2024-11-12
Payer: MEDICARE

## 2024-11-12 ENCOUNTER — LAB (OUTPATIENT)
Facility: HOSPITAL | Age: 69
End: 2024-11-12
Payer: MEDICARE

## 2024-11-12 VITALS
WEIGHT: 139.6 LBS | HEART RATE: 60 BPM | OXYGEN SATURATION: 100 % | DIASTOLIC BLOOD PRESSURE: 82 MMHG | BODY MASS INDEX: 23.83 KG/M2 | SYSTOLIC BLOOD PRESSURE: 132 MMHG | HEIGHT: 64 IN

## 2024-11-12 DIAGNOSIS — I10 PRIMARY HYPERTENSION: ICD-10-CM

## 2024-11-12 DIAGNOSIS — E55.9 VITAMIN D DEFICIENCY: ICD-10-CM

## 2024-11-12 DIAGNOSIS — I10 ESSENTIAL HYPERTENSION: ICD-10-CM

## 2024-11-12 DIAGNOSIS — I25.10 CORONARY ARTERY DISEASE INVOLVING NATIVE CORONARY ARTERY OF NATIVE HEART WITHOUT ANGINA PECTORIS: ICD-10-CM

## 2024-11-12 DIAGNOSIS — E78.5 HYPERLIPIDEMIA LDL GOAL <70: Primary | ICD-10-CM

## 2024-11-12 LAB
BASOPHILS # BLD AUTO: 0.04 10*3/MM3 (ref 0–0.2)
BASOPHILS NFR BLD AUTO: 0.7 % (ref 0–1.5)
DEPRECATED RDW RBC AUTO: 42.1 FL (ref 37–54)
EOSINOPHIL # BLD AUTO: 0.12 10*3/MM3 (ref 0–0.4)
EOSINOPHIL NFR BLD AUTO: 2 % (ref 0.3–6.2)
ERYTHROCYTE [DISTWIDTH] IN BLOOD BY AUTOMATED COUNT: 12 % (ref 12.3–15.4)
HCT VFR BLD AUTO: 40.9 % (ref 34–46.6)
HGB BLD-MCNC: 13.5 G/DL (ref 12–15.9)
IMM GRANULOCYTES # BLD AUTO: 0.01 10*3/MM3 (ref 0–0.05)
IMM GRANULOCYTES NFR BLD AUTO: 0.2 % (ref 0–0.5)
LYMPHOCYTES # BLD AUTO: 2.62 10*3/MM3 (ref 0.7–3.1)
LYMPHOCYTES NFR BLD AUTO: 44.7 % (ref 19.6–45.3)
MCH RBC QN AUTO: 31.3 PG (ref 26.6–33)
MCHC RBC AUTO-ENTMCNC: 33 G/DL (ref 31.5–35.7)
MCV RBC AUTO: 94.9 FL (ref 79–97)
MONOCYTES # BLD AUTO: 0.71 10*3/MM3 (ref 0.1–0.9)
MONOCYTES NFR BLD AUTO: 12.1 % (ref 5–12)
NEUTROPHILS NFR BLD AUTO: 2.36 10*3/MM3 (ref 1.7–7)
NEUTROPHILS NFR BLD AUTO: 40.3 % (ref 42.7–76)
NRBC BLD AUTO-RTO: 0 /100 WBC (ref 0–0.2)
PLATELET # BLD AUTO: 259 10*3/MM3 (ref 140–450)
PMV BLD AUTO: 10.9 FL (ref 6–12)
RBC # BLD AUTO: 4.31 10*6/MM3 (ref 3.77–5.28)
WBC NRBC COR # BLD AUTO: 5.86 10*3/MM3 (ref 3.4–10.8)

## 2024-11-12 PROCEDURE — 3079F DIAST BP 80-89 MM HG: CPT | Performed by: INTERNAL MEDICINE

## 2024-11-12 PROCEDURE — 80053 COMPREHEN METABOLIC PANEL: CPT | Performed by: INTERNAL MEDICINE

## 2024-11-12 PROCEDURE — 83735 ASSAY OF MAGNESIUM: CPT | Performed by: INTERNAL MEDICINE

## 2024-11-12 PROCEDURE — 80061 LIPID PANEL: CPT | Performed by: INTERNAL MEDICINE

## 2024-11-12 PROCEDURE — 36415 COLL VENOUS BLD VENIPUNCTURE: CPT | Performed by: INTERNAL MEDICINE

## 2024-11-12 PROCEDURE — 3075F SYST BP GE 130 - 139MM HG: CPT | Performed by: INTERNAL MEDICINE

## 2024-11-12 PROCEDURE — G2211 COMPLEX E/M VISIT ADD ON: HCPCS | Performed by: INTERNAL MEDICINE

## 2024-11-12 PROCEDURE — 99214 OFFICE O/P EST MOD 30 MIN: CPT | Performed by: INTERNAL MEDICINE

## 2024-11-12 PROCEDURE — 82306 VITAMIN D 25 HYDROXY: CPT | Performed by: INTERNAL MEDICINE

## 2024-11-12 PROCEDURE — 85025 COMPLETE CBC W/AUTO DIFF WBC: CPT | Performed by: INTERNAL MEDICINE

## 2024-11-12 PROCEDURE — 84443 ASSAY THYROID STIM HORMONE: CPT | Performed by: INTERNAL MEDICINE

## 2024-11-12 RX ORDER — ROSUVASTATIN CALCIUM 5 MG/1
5 TABLET, COATED ORAL EVERY OTHER DAY
Qty: 45 TABLET | Refills: 3 | Status: SHIPPED | OUTPATIENT
Start: 2024-11-12

## 2024-11-12 RX ORDER — VALSARTAN 40 MG/1
40 TABLET ORAL DAILY
Qty: 90 TABLET | Refills: 3 | Status: SHIPPED | OUTPATIENT
Start: 2024-11-12

## 2024-11-12 RX ORDER — NITROGLYCERIN 0.4 MG/1
0.4 TABLET SUBLINGUAL
Qty: 25 TABLET | Refills: 3 | Status: SHIPPED | OUTPATIENT
Start: 2024-11-12

## 2024-11-12 NOTE — PROGRESS NOTES
[facility]  Office visit  Taya Russ  1955    There is no work phone number on file.    VISIT DATE:  11/12/2024    PCP: Provider, No Known  [facility] [id number]    CC:  Chief Complaint   Patient presents with    Coronary artery disease involving native coronary artery of       Previous cardiac studies and procedures:  June 11, 2019 cardiac catheterization: LAD proximal 30 to 40% stenosis, left circumflex luminal irregularities, 20 to 30% RCA, EF 60%, LVEDP 10, localized area of hypokinesis and dyskinesis in the inferior wall.    November 2019 cardiac MRI: Small mid inferior wall transmural infarct, EF 60%    ASSESSMENT:   Diagnosis Plan   1. Hyperlipidemia LDL goal <70  Lipid Panel      2. Primary hypertension  CBC & Differential    Comprehensive Metabolic Panel    Magnesium    TSH      3. Vitamin D deficiency  Vitamin D,25-Hydroxy      4. Coronary artery disease involving native coronary artery of native heart without angina pectoris        5. Essential hypertension                PLAN:  Hypertension: Goal less than 130/80 mmHg.  Currently well controlled.  Continue valsartan.      Coronary disease: Unclear etiology for inferior myocardial infarction, potential etiologies include prolonged vasospasm, embolic event, scad.  No obvious CAD noted on cardiac catheterization, angiographically aired region between the RPDA and the distal RPL potentially consistent with a nearly flush occluded RPL branch.  Currently asymptomatic.  Continue aspirin, statin and afterload reduction.  Normal EF.    Hyperlipidemia: Goal LDL less than 70.  Previously well controlled.  Repeat fasting lipid panel pending.  Tolerating rosuvastatin every other day.  Previously with potential mild cognitive issues on atorvastatin and higher doses of rosuvastatin.    Subjective  Interval assessment: Blood pressures running less than 130/80 mmHg.  She is compliant with medical therapy.   Maintaining an active lifestyle without limitation.  Denies easy bruising or bleeding complications on aspirin.  Denies myalgias on statin therapy.      Initial presentation:64-year-old previously healthy female who developed moderate severe headaches with associated precordial chest discomfort on June 11.  Evaluation by EMS revealed severe hypertension with blood pressures over 200/100 mmHg, and dynamic repolarization abnormalities concerning for an acute inferior ST elevation myocardial infarction.  She underwent an urgent cardiac catheterization at [facility] which only revealed mild nonobstructive coronary atherosclerosis.  Angiogram did reveal normal EF with inferior hypokinesis.  Initial EKGs from [facility] on the day of her heart catheterization were unremarkable.  Follow-up EKG on June 12 revealed developing inferior T wave inversions with very subtle inferior ST elevation and no significant Q waves.  EKG today reveals persistent inferior T wave inversions which have now extended to V5 and V6 with the loss of R wave in lead III.  Troponin I of 11.1 on June 12.  CTA chest during hospitalization was unremarkable.  Echo not performed during this hospitalization.  She was treated with dual antiplatelet therapy, high intensity statin therapy and a combination of carvedilol and valsartan for suspected acute coronary syndrome.  Possible intracoronary spasm versus thrombosis which had resolved prior to angiogram or possible hypertensive urgency with associated regional wall motion abnormalities and EKG changes.  She has been stable on her current medical therapy with only mild fatigue.  Blood pressures are now consistently running less than 135/85 mmHg.  No further episodes of chest discomfort.  She has intermittent muscular skeletal tension headaches which are relieved and prevented with monthly deep tissue massage.     Her  passed away from complications from a ruptured gastric ulcer, she is concerned  "about the long-term risk factors of aspirin therapy.    PHYSICAL EXAMINATION:  Vitals:    11/12/24 0918   BP: 132/82   BP Location: Left arm   Patient Position: Sitting   Cuff Size: Adult   Pulse: 60   SpO2: 100%   Weight: 63.3 kg (139 lb 9.6 oz)   Height: 162.6 cm (64.02\")       General Appearance:    Alert, cooperative, no distress, appears stated age   Head:    Normocephalic, without obvious abnormality, atraumatic   Eyes:    conjunctiva/corneas clear   Nose:   Nares normal, septum midline, mucosa normal, no drainage   Throat:   Lips, teeth and gums normal   Neck:   Supple, symmetrical, trachea midline, no carotid    bruit or JVD   Lungs:     Clear to auscultation bilaterally, respirations unlabored   Chest Wall:    No tenderness or deformity    Heart:    Regular rate and rhythm, S1 and S2 normal, no murmur, rub   or gallop, normal carotid impulse bilaterally without bruit.   Abdomen:     Soft, non-tender   Extremities:   Extremities normal, atraumatic, no cyanosis or edema   Pulses:   2+ and symmetric all extremities   Skin:   Skin color, texture, turgor normal, no rashes or lesions       Diagnostic Data:  Procedures  Lab Results   Component Value Date    TRIG 80 11/03/2023    HDL 67 (H) 11/03/2023     Lab Results   Component Value Date    GLUCOSE 97 11/03/2023    BUN 14 11/03/2023    CREATININE 0.64 11/03/2023     11/03/2023    K 5.0 11/03/2023     11/03/2023    CO2 28.0 11/03/2023     No results found for: \"HGBA1C\"  Lab Results   Component Value Date    WBC 6.06 11/03/2023    HGB 14.0 11/03/2023    HCT 40.0 11/03/2023     11/03/2023       Allergies  No Known Allergies    Current Medications    Current Outpatient Medications:     aspirin 81 MG EC tablet, Take 1 tablet by mouth Daily., Disp: , Rfl:     Calcium Citrate (CITRACAL PO), Take 2 tablets by mouth 2 (Two) Times a Day., Disp: , Rfl:     Collagen Hydrolysate powder, QAM, Disp: , Rfl:     nitroglycerin (NITROSTAT) 0.4 MG SL tablet, Place " 1 tablet under the tongue Every 5 (Five) Minutes As Needed for Chest Pain. Take no more than 3 doses in 15 minutes., Disp: 25 tablet, Rfl: 3    rosuvastatin (CRESTOR) 5 MG tablet, Take 1 tablet by mouth Every Other Day., Disp: 45 tablet, Rfl: 3    valsartan (DIOVAN) 40 MG tablet, Take 1 tablet by mouth Daily., Disp: 90 tablet, Rfl: 3          ROS  Review of Systems   Cardiovascular:  Positive for dyspnea on exertion.   Respiratory:  Positive for shortness of breath.          SOCIAL HX  Social History     Socioeconomic History    Marital status:    Tobacco Use    Smoking status: Former     Current packs/day: 0.00     Types: Cigarettes     Quit date: 1986     Years since quittin.8     Passive exposure: Past    Smokeless tobacco: Never   Vaping Use    Vaping status: Never Used   Substance and Sexual Activity    Alcohol use: Yes     Comment: occas    Drug use: No    Sexual activity: Defer       FAMILY HX  Family History   Problem Relation Age of Onset    Hypertension Mother     No Known Problems Father     No Known Problems Sister     No Known Problems Sister     No Known Problems Sister              Aleks Richard III, MD, FACC

## 2024-11-13 ENCOUNTER — TELEPHONE (OUTPATIENT)
Dept: CARDIOLOGY | Facility: CLINIC | Age: 69
End: 2024-11-13
Payer: MEDICARE

## 2024-11-13 LAB
25(OH)D3 SERPL-MCNC: 47.6 NG/ML (ref 30–100)
ALBUMIN SERPL-MCNC: 4.1 G/DL (ref 3.5–5.2)
ALBUMIN/GLOB SERPL: 1.7 G/DL
ALP SERPL-CCNC: 43 U/L (ref 39–117)
ALT SERPL W P-5'-P-CCNC: 17 U/L (ref 1–33)
ANION GAP SERPL CALCULATED.3IONS-SCNC: 7.7 MMOL/L (ref 5–15)
AST SERPL-CCNC: 22 U/L (ref 1–32)
BILIRUB SERPL-MCNC: 0.3 MG/DL (ref 0–1.2)
BUN SERPL-MCNC: 15 MG/DL (ref 8–23)
BUN/CREAT SERPL: 25.4 (ref 7–25)
CALCIUM SPEC-SCNC: 9.3 MG/DL (ref 8.6–10.5)
CHLORIDE SERPL-SCNC: 107 MMOL/L (ref 98–107)
CHOLEST SERPL-MCNC: 155 MG/DL (ref 0–200)
CO2 SERPL-SCNC: 26.3 MMOL/L (ref 22–29)
CREAT SERPL-MCNC: 0.59 MG/DL (ref 0.57–1)
EGFRCR SERPLBLD CKD-EPI 2021: 98.3 ML/MIN/1.73
GLOBULIN UR ELPH-MCNC: 2.4 GM/DL
GLUCOSE SERPL-MCNC: 86 MG/DL (ref 65–99)
HDLC SERPL-MCNC: 59 MG/DL (ref 40–60)
LDLC SERPL CALC-MCNC: 83 MG/DL (ref 0–100)
LDLC/HDLC SERPL: 1.4 {RATIO}
MAGNESIUM SERPL-MCNC: 2.1 MG/DL (ref 1.6–2.4)
POTASSIUM SERPL-SCNC: 5.3 MMOL/L (ref 3.5–5.2)
PROT SERPL-MCNC: 6.5 G/DL (ref 6–8.5)
SODIUM SERPL-SCNC: 141 MMOL/L (ref 136–145)
TRIGL SERPL-MCNC: 67 MG/DL (ref 0–150)
TSH SERPL DL<=0.05 MIU/L-ACNC: 1.37 UIU/ML (ref 0.27–4.2)
VLDLC SERPL-MCNC: 13 MG/DL (ref 5–40)

## 2024-11-13 NOTE — TELEPHONE ENCOUNTER
----- Message from Aleks Richard sent at 11/13/2024 10:33 AM EST -----  Cholesterol numbers have improved.  No concerning abnormalities on your lab work.  Continue current medical therapy.